# Patient Record
Sex: MALE | Race: OTHER | HISPANIC OR LATINO | URBAN - METROPOLITAN AREA
[De-identification: names, ages, dates, MRNs, and addresses within clinical notes are randomized per-mention and may not be internally consistent; named-entity substitution may affect disease eponyms.]

---

## 2017-11-22 ENCOUNTER — INPATIENT (INPATIENT)
Facility: HOSPITAL | Age: 52
LOS: 2 days | Discharge: HOME | DRG: 369 | End: 2017-11-25
Attending: INTERNAL MEDICINE | Admitting: INTERNAL MEDICINE
Payer: SELF-PAY

## 2017-11-22 VITALS
RESPIRATION RATE: 22 BRPM | DIASTOLIC BLOOD PRESSURE: 55 MMHG | SYSTOLIC BLOOD PRESSURE: 92 MMHG | HEIGHT: 69 IN | HEART RATE: 107 BPM | WEIGHT: 149.91 LBS

## 2017-11-22 DIAGNOSIS — K92.2 GASTROINTESTINAL HEMORRHAGE, UNSPECIFIED: ICD-10-CM

## 2017-11-22 LAB
ABO RH CONFIRMATION: SIGNIFICANT CHANGE UP
ALBUMIN SERPL ELPH-MCNC: 3 G/DL — LOW (ref 3.3–5.2)
ALP SERPL-CCNC: 32 U/L — LOW (ref 40–120)
ALT FLD-CCNC: 5 U/L — SIGNIFICANT CHANGE UP
ANION GAP SERPL CALC-SCNC: 12 MMOL/L — SIGNIFICANT CHANGE UP (ref 5–17)
ANION GAP SERPL CALC-SCNC: 9 MMOL/L — SIGNIFICANT CHANGE UP (ref 5–17)
APPEARANCE UR: CLEAR — SIGNIFICANT CHANGE UP
APTT BLD: 25.6 SEC — LOW (ref 27.5–37.4)
AST SERPL-CCNC: 12 U/L — SIGNIFICANT CHANGE UP
BASOPHILS # BLD AUTO: 0 K/UL — SIGNIFICANT CHANGE UP (ref 0–0.2)
BASOPHILS NFR BLD AUTO: 0.1 % — SIGNIFICANT CHANGE UP (ref 0–2)
BILIRUB SERPL-MCNC: 0.2 MG/DL — LOW (ref 0.4–2)
BILIRUB UR-MCNC: NEGATIVE — SIGNIFICANT CHANGE UP
BLD GP AB SCN SERPL QL: SIGNIFICANT CHANGE UP
BUN SERPL-MCNC: 31 MG/DL — HIGH (ref 8–20)
BUN SERPL-MCNC: 37 MG/DL — HIGH (ref 8–20)
CALCIUM SERPL-MCNC: 7.3 MG/DL — LOW (ref 8.6–10.2)
CALCIUM SERPL-MCNC: 7.6 MG/DL — LOW (ref 8.6–10.2)
CHLORIDE SERPL-SCNC: 103 MMOL/L — SIGNIFICANT CHANGE UP (ref 98–107)
CHLORIDE SERPL-SCNC: 108 MMOL/L — HIGH (ref 98–107)
CO2 SERPL-SCNC: 23 MMOL/L — SIGNIFICANT CHANGE UP (ref 22–29)
CO2 SERPL-SCNC: 24 MMOL/L — SIGNIFICANT CHANGE UP (ref 22–29)
COLOR SPEC: YELLOW — SIGNIFICANT CHANGE UP
CREAT SERPL-MCNC: 0.63 MG/DL — SIGNIFICANT CHANGE UP (ref 0.5–1.3)
CREAT SERPL-MCNC: 1.01 MG/DL — SIGNIFICANT CHANGE UP (ref 0.5–1.3)
DIFF PNL FLD: NEGATIVE — SIGNIFICANT CHANGE UP
EOSINOPHIL # BLD AUTO: 0 K/UL — SIGNIFICANT CHANGE UP (ref 0–0.5)
EOSINOPHIL NFR BLD AUTO: 0.2 % — SIGNIFICANT CHANGE UP (ref 0–5)
EPI CELLS # UR: SIGNIFICANT CHANGE UP
GLUCOSE BLDC GLUCOMTR-MCNC: 103 MG/DL — HIGH (ref 70–99)
GLUCOSE BLDC GLUCOMTR-MCNC: 106 MG/DL — HIGH (ref 70–99)
GLUCOSE SERPL-MCNC: 106 MG/DL — SIGNIFICANT CHANGE UP (ref 70–115)
GLUCOSE SERPL-MCNC: 209 MG/DL — HIGH (ref 70–115)
GLUCOSE UR QL: NEGATIVE MG/DL — SIGNIFICANT CHANGE UP
HCT VFR BLD CALC: 23.8 % — LOW (ref 42–52)
HCT VFR BLD CALC: 23.8 % — LOW (ref 42–52)
HCT VFR BLD CALC: 27 % — LOW (ref 42–52)
HGB BLD-MCNC: 8.2 G/DL — LOW (ref 14–18)
HGB BLD-MCNC: 8.3 G/DL — LOW (ref 14–18)
HGB BLD-MCNC: 9.5 G/DL — LOW (ref 14–18)
INR BLD: 1.19 RATIO — HIGH (ref 0.88–1.16)
KETONES UR-MCNC: NEGATIVE — SIGNIFICANT CHANGE UP
LACTATE BLDV-MCNC: 1.8 MMOL/L — SIGNIFICANT CHANGE UP (ref 0.5–2)
LACTATE BLDV-MCNC: 2.8 MMOL/L — HIGH (ref 0.5–2)
LEUKOCYTE ESTERASE UR-ACNC: NEGATIVE — SIGNIFICANT CHANGE UP
LYMPHOCYTES # BLD AUTO: 2 K/UL — SIGNIFICANT CHANGE UP (ref 1–4.8)
LYMPHOCYTES # BLD AUTO: 24 % — SIGNIFICANT CHANGE UP (ref 20–55)
MCHC RBC-ENTMCNC: 29.3 PG — SIGNIFICANT CHANGE UP (ref 27–31)
MCHC RBC-ENTMCNC: 29.5 PG — SIGNIFICANT CHANGE UP (ref 27–31)
MCHC RBC-ENTMCNC: 34.5 G/DL — SIGNIFICANT CHANGE UP (ref 32–36)
MCHC RBC-ENTMCNC: 35.2 G/DL — SIGNIFICANT CHANGE UP (ref 32–36)
MCV RBC AUTO: 83.3 FL — SIGNIFICANT CHANGE UP (ref 80–94)
MCV RBC AUTO: 85.6 FL — SIGNIFICANT CHANGE UP (ref 80–94)
MONOCYTES # BLD AUTO: 0.6 K/UL — SIGNIFICANT CHANGE UP (ref 0–0.8)
MONOCYTES NFR BLD AUTO: 6.7 % — SIGNIFICANT CHANGE UP (ref 3–10)
NEUTROPHILS # BLD AUTO: 5.7 K/UL — SIGNIFICANT CHANGE UP (ref 1.8–8)
NEUTROPHILS NFR BLD AUTO: 68.8 % — SIGNIFICANT CHANGE UP (ref 37–73)
NITRITE UR-MCNC: NEGATIVE — SIGNIFICANT CHANGE UP
PH UR: 7 — SIGNIFICANT CHANGE UP (ref 5–8)
PLATELET # BLD AUTO: 130 K/UL — LOW (ref 150–400)
PLATELET # BLD AUTO: 202 K/UL — SIGNIFICANT CHANGE UP (ref 150–400)
POTASSIUM SERPL-MCNC: 4.2 MMOL/L — SIGNIFICANT CHANGE UP (ref 3.5–5.3)
POTASSIUM SERPL-MCNC: 4.5 MMOL/L — SIGNIFICANT CHANGE UP (ref 3.5–5.3)
POTASSIUM SERPL-SCNC: 4.2 MMOL/L — SIGNIFICANT CHANGE UP (ref 3.5–5.3)
POTASSIUM SERPL-SCNC: 4.5 MMOL/L — SIGNIFICANT CHANGE UP (ref 3.5–5.3)
PROT SERPL-MCNC: 4.8 G/DL — LOW (ref 6.6–8.7)
PROT UR-MCNC: 15 MG/DL
PROTHROM AB SERPL-ACNC: 13.1 SEC — HIGH (ref 9.8–12.7)
RBC # BLD: 2.78 M/UL — LOW (ref 4.6–6.2)
RBC # BLD: 3.24 M/UL — LOW (ref 4.6–6.2)
RBC # FLD: 12.8 % — SIGNIFICANT CHANGE UP (ref 11–15.6)
RBC # FLD: 13.5 % — SIGNIFICANT CHANGE UP (ref 11–15.6)
SODIUM SERPL-SCNC: 139 MMOL/L — SIGNIFICANT CHANGE UP (ref 135–145)
SODIUM SERPL-SCNC: 140 MMOL/L — SIGNIFICANT CHANGE UP (ref 135–145)
SP GR SPEC: 1.01 — SIGNIFICANT CHANGE UP (ref 1.01–1.02)
TROPONIN T SERPL-MCNC: <0.01 NG/ML — SIGNIFICANT CHANGE UP (ref 0–0.06)
TYPE + AB SCN PNL BLD: SIGNIFICANT CHANGE UP
UROBILINOGEN FLD QL: NEGATIVE MG/DL — SIGNIFICANT CHANGE UP
WBC # BLD: 10.6 K/UL — SIGNIFICANT CHANGE UP (ref 4.8–10.8)
WBC # BLD: 8.4 K/UL — SIGNIFICANT CHANGE UP (ref 4.8–10.8)
WBC # FLD AUTO: 10.6 K/UL — SIGNIFICANT CHANGE UP (ref 4.8–10.8)
WBC # FLD AUTO: 8.4 K/UL — SIGNIFICANT CHANGE UP (ref 4.8–10.8)
WBC UR QL: SIGNIFICANT CHANGE UP

## 2017-11-22 RX ORDER — INFLUENZA VIRUS VACCINE 15; 15; 15; 15 UG/.5ML; UG/.5ML; UG/.5ML; UG/.5ML
0.5 SUSPENSION INTRAMUSCULAR ONCE
Qty: 0 | Refills: 0 | Status: DISCONTINUED | OUTPATIENT
Start: 2017-11-22 | End: 2017-11-25

## 2017-11-22 RX ORDER — SODIUM CHLORIDE 9 MG/ML
1000 INJECTION, SOLUTION INTRAVENOUS
Qty: 0 | Refills: 0 | Status: DISCONTINUED | OUTPATIENT
Start: 2017-11-22 | End: 2017-11-23

## 2017-11-22 RX ORDER — CEFTRIAXONE 500 MG/1
1 INJECTION, POWDER, FOR SOLUTION INTRAMUSCULAR; INTRAVENOUS ONCE
Qty: 0 | Refills: 0 | Status: COMPLETED | OUTPATIENT
Start: 2017-11-22 | End: 2017-11-22

## 2017-11-22 RX ORDER — PANTOPRAZOLE SODIUM 20 MG/1
40 TABLET, DELAYED RELEASE ORAL
Qty: 0 | Refills: 0 | Status: DISCONTINUED | OUTPATIENT
Start: 2017-11-22 | End: 2017-11-24

## 2017-11-22 RX ORDER — ONDANSETRON 8 MG/1
4 TABLET, FILM COATED ORAL EVERY 6 HOURS
Qty: 0 | Refills: 0 | Status: DISCONTINUED | OUTPATIENT
Start: 2017-11-22 | End: 2017-11-25

## 2017-11-22 RX ORDER — PANTOPRAZOLE SODIUM 20 MG/1
80 TABLET, DELAYED RELEASE ORAL ONCE
Qty: 0 | Refills: 0 | Status: COMPLETED | OUTPATIENT
Start: 2017-11-22 | End: 2017-11-22

## 2017-11-22 RX ORDER — PANTOPRAZOLE SODIUM 20 MG/1
8 TABLET, DELAYED RELEASE ORAL
Qty: 80 | Refills: 0 | Status: DISCONTINUED | OUTPATIENT
Start: 2017-11-22 | End: 2017-11-22

## 2017-11-22 RX ORDER — SODIUM CHLORIDE 9 MG/ML
500 INJECTION, SOLUTION INTRAVENOUS ONCE
Qty: 0 | Refills: 0 | Status: COMPLETED | OUTPATIENT
Start: 2017-11-22 | End: 2017-11-22

## 2017-11-22 RX ORDER — METOCLOPRAMIDE HCL 10 MG
10 TABLET ORAL EVERY 6 HOURS
Qty: 0 | Refills: 0 | Status: COMPLETED | OUTPATIENT
Start: 2017-11-22 | End: 2017-11-22

## 2017-11-22 RX ADMIN — SODIUM CHLORIDE 1000 MILLILITER(S): 9 INJECTION, SOLUTION INTRAVENOUS at 12:58

## 2017-11-22 RX ADMIN — CEFTRIAXONE 100 GRAM(S): 500 INJECTION, POWDER, FOR SOLUTION INTRAMUSCULAR; INTRAVENOUS at 06:13

## 2017-11-22 RX ADMIN — SODIUM CHLORIDE 125 MILLILITER(S): 9 INJECTION, SOLUTION INTRAVENOUS at 07:24

## 2017-11-22 RX ADMIN — Medication 10 MILLIGRAM(S): at 20:26

## 2017-11-22 RX ADMIN — PANTOPRAZOLE SODIUM 80 MILLIGRAM(S): 20 TABLET, DELAYED RELEASE ORAL at 04:43

## 2017-11-22 RX ADMIN — SODIUM CHLORIDE 125 MILLILITER(S): 9 INJECTION, SOLUTION INTRAVENOUS at 12:57

## 2017-11-22 RX ADMIN — PANTOPRAZOLE SODIUM 10 MG/HR: 20 TABLET, DELAYED RELEASE ORAL at 04:58

## 2017-11-22 RX ADMIN — SODIUM CHLORIDE 2000 MILLILITER(S): 9 INJECTION, SOLUTION INTRAVENOUS at 07:57

## 2017-11-22 RX ADMIN — PANTOPRAZOLE SODIUM 10 MG/HR: 20 TABLET, DELAYED RELEASE ORAL at 13:47

## 2017-11-22 RX ADMIN — Medication 10 MILLIGRAM(S): at 07:23

## 2017-11-22 RX ADMIN — PANTOPRAZOLE SODIUM 40 MILLIGRAM(S): 20 TABLET, DELAYED RELEASE ORAL at 20:27

## 2017-11-22 RX ADMIN — Medication 10 MILLIGRAM(S): at 13:48

## 2017-11-22 NOTE — BRIEF OPERATIVE NOTE - COMMENTS
PPI bid  Repeat EGD in 3 months to confirm ulcer healing  Treat for H. pylori if positive  Clear liquid diet

## 2017-11-22 NOTE — ED PROVIDER NOTE - OBJECTIVE STATEMENT
51 y/o with a PMHx of peptic ulcers BIBA to the ED c/o hematemesis, melanotic stool, dizziness, hypotension worsening over the last 6 days. Pt states that he has been vomiting for 6 hours. ROS is limited due to pt being in severe GI distress.

## 2017-11-22 NOTE — H&P ADULT - NSHPLABSRESULTS_GEN_ALL_CORE
139  |  103  |  37.0<H>  ----------------------------<  209<H>  4.2   |  24.0  |  1.01    Ca    7.6<L>      22 Nov 2017 05:02  TPro  4.8<L>  /  Alb  3.0<L>  /  TBili  0.2<L>  /  DBili  x   /  AST  12  /  ALT  5   /  AlkPhos  32<L>  11-22                       8.2    8.4   )-----------( 202      ( 22 Nov 2017 05:02 )             23.8   PT/INR - ( 22 Nov 2017 05:02 )   PT: 13.1 sec;   INR: 1.19 ratio    PTT - ( 22 Nov 2017 05:02 )  PTT:25.6 sec  LIVER FUNCTIONS - ( 22 Nov 2017 05:02 )  Alb: 3.0 g/dL / Pro: 4.8 g/dL / ALK PHOS: 32 U/L / ALT: 5 U/L / AST: 12 U/L / GGT: x

## 2017-11-22 NOTE — ED ADULT NURSE NOTE - OBJECTIVE STATEMENT
pt biba. pt came in diaphoretic, as per ems vomiting blood for the past 6 hours. pt has h/o gastric ulcers. MD gamez and code team 2 at bedside.

## 2017-11-22 NOTE — H&P ADULT - HISTORY OF PRESENT ILLNESS
52 year old male who's only PMHx is of peptic ulcer which was diagnosed 6 years ago presented to ER with a 6 hour history of 52 year old male who's only PMHx is of peptic ulcer which was diagnosed 6 years ago presented to ER with a 6 hour history of vomiting bright red blood.

## 2017-11-22 NOTE — ED PROVIDER NOTE - MEDICAL DECISION MAKING DETAILS
53 y/o with brisk upper GI bleed. Will start on protonic drip, transfused cross match blood due to hemo. Call GI to be evaluate in the ICU.

## 2017-11-22 NOTE — H&P ADULT - ATTENDING COMMENTS
I have seen and evaluated the patient and agree with the assessment and plan     GI bleed hx of peptic ulcer   EGD showing ulcer BID protonix, monitor overnight transfer in am if stabel  monitor h/h transfuse <7

## 2017-11-22 NOTE — ED ADULT TRIAGE NOTE - CHIEF COMPLAINT QUOTE
Pt with vomiting blood x's 6 hours, arrives pale, c/o abd pain 9/10, denies difficulty breathing, denies chest pain, hx of gastric ulcers, MD Moncada called to bedside

## 2017-11-22 NOTE — BRIEF OPERATIVE NOTE - POST-OP DX
Acute gastric ulcer with hemorrhage  11/22/2017    Active  Sanford White  Babita-Garrett tear  11/22/2017    Active  Sanford White

## 2017-11-22 NOTE — ED ADULT NURSE REASSESSMENT NOTE - NS ED NURSE REASSESS COMMENT FT1
Due to pt coughing, NG tube began to come out of pt mouth, as per MD Moncada, remove NG tube, pt feels relief, denies SOB, sat at 100% on room air, pt now showing NSR on monitor at 98 bpm, awaiting MICU bed to be clean, pt family @ bedside, will continue to monitor. Due to pt coughing, NG tube began to come out of pt mouth, as per MD Moncada, remove NG tube, pt feels relief, denies SOB, sat at 100% on room air, pt now showing NSR on monitor at 98 bpm, awaiting MICU bed to be clean, pt family @ bedside, 1:1 nursing care being provided, will continue to monitor.

## 2017-11-22 NOTE — H&P ADULT - NSHPPHYSICALEXAM_GEN_ALL_CORE
Physicial Exam:     Constitutional: NAD, well-groomed, well-developed  HEENT: PERRLA, EOMI, no drainage or redness  Neck: No bruits; no thyromegaly or nodules,  No JVD  Back: Normal spine flexure, No CVA tenderness, No deformity or limitation of movement  Respiratory: Breath Sounds equal & clear to percussion & auscultation, no accessory muscle use  Cardiovascular: Regular rate & rhythm, normal S1, S2; no murmurs, gallops or rubs; no S3, S4  Gastrointestinal: Soft, non-tender, non distended no hepatosplenomegaly, normal bowel sounds  Extremities: No peripheral edema, No cyanosis, clubbing   Vascular: Equal and normal pulses: 2+ peripheral pulses throughout  Neurological: GCS:    A&O x 3; no sensory, motor  deficits, normal reflexes  Psychiatric: Normal mood, normal affect  Musculoskeletal: No joint pain, swelling or deformity; no limitation of movement  Skin: No rashes

## 2017-11-22 NOTE — CONSULT NOTE ADULT - SUBJECTIVE AND OBJECTIVE BOX
HPI:  52 year old male who's only PMHx is of peptic ulcer which was diagnosed 6 years ago presented to ER with a 6 hour history of vomiting bright red blood. Patient was hemodynamically unstable and received 1 Litre of fluid bolus and also 2 units of PRBC transfusion. NG tube lavage was performed. During coughing episode, NG tube was noted to be displaced and then removed.       PAST MEDICAL & SURGICAL HISTORY:  Peptic ulcer  No significant past surgical history      ROS:  No Heartburn, regurgitation, dysphagia, odynophagia.  No dyspepsia  No abdominal pain.    No Nausea, vomiting.  No Bleeding.  + hematemesis.   No diarrhea.    No hematochezia  No weight loss, anorexia.  No edema.      MEDICATIONS  (STANDING):  multiple electrolytes Injection Type 1 1000 milliLiter(s) (125 mL/Hr) IV Continuous <Continuous>  pantoprazole Infusion 8 mG/Hr (10 mL/Hr) IV Continuous <Continuous>    MEDICATIONS  (PRN):  ondansetron Injectable 4 milliGRAM(s) IV Push every 6 hours PRN Nausea and/or Vomiting      Allergies    No Known Allergies    Intolerances        SOCIAL HISTORY:Denies x 3    ENDOSCOPIC/GI HISTORY: EGD 6 years ago    FAMILY HISTORY:  No pertinent family history in first degree relatives      Vital Signs Last 24 Hrs  T(C): 36.2 (22 Nov 2017 05:35), Max: 36.2 (22 Nov 2017 04:40)  T(F): 97.2 (22 Nov 2017 05:35), Max: 97.2 (22 Nov 2017 05:35)  HR: 98 (22 Nov 2017 06:03) (98 - 134)  BP: 98/63 (22 Nov 2017 06:03) (85/49 - 102/56)  BP(mean): --  RR: 18 (22 Nov 2017 06:03) (18 - 22)  SpO2: 100% (22 Nov 2017 06:03) (100% - 100%)    PHYSICAL EXAM:    GENERAL: NAD, well-groomed, well-developed  HEAD:  Atraumatic, Normocephalic  EYES: EOMI, PERRLA, conjunctiva and sclera clear  ENMT: No tonsillar erythema, exudates, or enlargement; Moist mucous membranes, Good dentition, No lesions  NECK: Supple, No JVD, Normal thyroid  CHEST/LUNG: Clear to percussion bilaterally; No rales, rhonchi, wheezing, or rubs  HEART: Regular rate and rhythm; No murmurs, rubs, or gallops  ABDOMEN: Soft, Nontender, Nondistended; Bowel sounds present  RECTAL:   EXTREMITIES:  2+ Peripheral Pulses, No clubbing, cyanosis, or edema  LYMPH: No lymphadenopathy noted  SKIN: No rashes or lesions      LABS:                        8.2    8.4   )-----------( 202      ( 22 Nov 2017 05:02 )             23.8     11-22    139  |  103  |  37.0<H>  ----------------------------<  209<H>  4.2   |  24.0  |  1.01    Ca    7.6<L>      22 Nov 2017 05:02    TPro  4.8<L>  /  Alb  3.0<L>  /  TBili  0.2<L>  /  DBili  x   /  AST  12  /  ALT  5   /  AlkPhos  32<L>  11-22    PT/INR - ( 22 Nov 2017 05:02 )   PT: 13.1 sec;   INR: 1.19 ratio         PTT - ( 22 Nov 2017 05:02 )  PTT:25.6 sec       LIVER FUNCTIONS - ( 22 Nov 2017 05:02 )  Alb: 3.0 g/dL / Pro: 4.8 g/dL / ALK PHOS: 32 U/L / ALT: 5 U/L / AST: 12 U/L / GGT: x           Troponin T, Serum: <0.01: Reference Interval for Troponin T  Less than 0.06 ng/mL - includes the 99th percentile of a healthy  population at a method C.V. of 10% or less.  NOTE: Troponin T is measured by the Roche CLIA method and these  results are not interchangable with Troponin I results since they are  different assays with different reference intervals. ng/mL (11.22.17 @ 05:02)    RADIOLOGY & ADDITIONAL STUDIES: HPI:  52 year old male who's only PMHx is of peptic ulcer which was diagnosed 6 years ago presented to ER with a 6 hour history of vomiting bright red blood. He also had epigastric pain. Patient was hemodynamically unstable and received 1 Litre of fluid bolus and also 2 units of PRBC transfusion. NG tube lavage was performed. During coughing episode, NG tube was noted to be displaced and then removed.       PAST MEDICAL & SURGICAL HISTORY:  Peptic ulcer  No significant past surgical history      ROS:  No Heartburn, regurgitation, dysphagia, odynophagia.  No dyspepsia  No abdominal pain.    No Nausea, vomiting.  No Bleeding.  + hematemesis.   No diarrhea.    No hematochezia  No weight loss, anorexia.  No edema.      MEDICATIONS  (STANDING):  multiple electrolytes Injection Type 1 1000 milliLiter(s) (125 mL/Hr) IV Continuous <Continuous>  pantoprazole Infusion 8 mG/Hr (10 mL/Hr) IV Continuous <Continuous>    MEDICATIONS  (PRN):  ondansetron Injectable 4 milliGRAM(s) IV Push every 6 hours PRN Nausea and/or Vomiting      Allergies    No Known Allergies    Intolerances        SOCIAL HISTORY:Denies x 3    ENDOSCOPIC/GI HISTORY: EGD 6 years ago    FAMILY HISTORY:  No pertinent family history in first degree relatives      Vital Signs Last 24 Hrs  T(C): 36.2 (22 Nov 2017 05:35), Max: 36.2 (22 Nov 2017 04:40)  T(F): 97.2 (22 Nov 2017 05:35), Max: 97.2 (22 Nov 2017 05:35)  HR: 98 (22 Nov 2017 06:03) (98 - 134)  BP: 98/63 (22 Nov 2017 06:03) (85/49 - 102/56)  BP(mean): --  RR: 18 (22 Nov 2017 06:03) (18 - 22)  SpO2: 100% (22 Nov 2017 06:03) (100% - 100%)    PHYSICAL EXAM:    GENERAL: NAD, well-groomed, well-developed  HEAD:  Atraumatic, Normocephalic  EYES: EOMI, PERRLA, conjunctiva and sclera clear  ENMT: No tonsillar erythema, exudates, or enlargement; Moist mucous membranes, Good dentition, No lesions  NECK: Supple, No JVD, Normal thyroid  CHEST/LUNG: Clear to percussion bilaterally; No rales, rhonchi, wheezing, or rubs  HEART: Regular rate and rhythm; No murmurs, rubs, or gallops  ABDOMEN: Soft, Nontender, Nondistended; Bowel sounds present  RECTAL: Maroon colored blood  EXTREMITIES:  2+ Peripheral Pulses, No clubbing, cyanosis, or edema  LYMPH: No lymphadenopathy noted  SKIN: No rashes or lesions      LABS:                        8.2    8.4   )-----------( 202      ( 22 Nov 2017 05:02 )             23.8     11-22    139  |  103  |  37.0<H>  ----------------------------<  209<H>  4.2   |  24.0  |  1.01    Ca    7.6<L>      22 Nov 2017 05:02    TPro  4.8<L>  /  Alb  3.0<L>  /  TBili  0.2<L>  /  DBili  x   /  AST  12  /  ALT  5   /  AlkPhos  32<L>  11-22    PT/INR - ( 22 Nov 2017 05:02 )   PT: 13.1 sec;   INR: 1.19 ratio         PTT - ( 22 Nov 2017 05:02 )  PTT:25.6 sec       LIVER FUNCTIONS - ( 22 Nov 2017 05:02 )  Alb: 3.0 g/dL / Pro: 4.8 g/dL / ALK PHOS: 32 U/L / ALT: 5 U/L / AST: 12 U/L / GGT: x           Troponin T, Serum: <0.01: Reference Interval for Troponin T  Less than 0.06 ng/mL - includes the 99th percentile of a healthy  population at a method C.V. of 10% or less.  NOTE: Troponin T is measured by the Roche CLIA method and these  results are not interchangable with Troponin I results since they are  different assays with different reference intervals. ng/mL (11.22.17 @ 05:02)    RADIOLOGY & ADDITIONAL STUDIES:

## 2017-11-22 NOTE — PATIENT PROFILE ADULT. - TEACHING/LEARNING LEARNING PREFERENCES
skill demonstration/group instruction/individual instruction/verbal instruction/video/written material/computer/internet/pictorial/audio

## 2017-11-22 NOTE — H&P ADULT - NSHPREVIEWOFSYSTEMS_GEN_ALL_CORE
REVIEW OF SYSTEMS:    CONSTITUTIONAL: pale vomiting blood x 6 hours  EYES: No eye pain, visual disturbances, or discharge  ENMT:  No difficulty hearing, tinnitus, vertigo; No sinus or throat pain  NECK: No pain or stiffness  BREASTS: No pain, masses, or nipple discharge  RESPIRATORY: No cough, wheezing, chills or hemoptysis; No shortness of breath  CARDIOVASCULAR: No chest pain, palpitations, dizziness, or leg swelling  GASTROINTESTINAL: No abdominal or epigastric pain. possitive nausea, vomiting, possitive hematemesis; No diarrhea or constipation. No melena or hematochezia.  GENITOURINARY: No dysuria, frequency, hematuria, or incontinence  NEUROLOGICAL: No headaches, memory loss, loss of strength, numbness, or tremors  SKIN: No itching, burning, rashes, or lesions   LYMPH NODES: No enlarged glands  ENDOCRINE: No heat or cold intolerance; No hair loss  MUSCULOSKELETAL: No joint pain or swelling; No muscle, back, or extremity pain  PSYCHIATRIC: No depression, anxiety, mood swings, or difficulty sleeping  HEME/LYMPH: No easy bruising, or bleeding gums  ALLERY AND IMMUNOLOGIC: No hives or eczema

## 2017-11-23 DIAGNOSIS — E87.6 HYPOKALEMIA: ICD-10-CM

## 2017-11-23 DIAGNOSIS — K27.9 PEPTIC ULCER, SITE UNSPECIFIED, UNSPECIFIED AS ACUTE OR CHRONIC, WITHOUT HEMORRHAGE OR PERFORATION: ICD-10-CM

## 2017-11-23 DIAGNOSIS — D62 ACUTE POSTHEMORRHAGIC ANEMIA: ICD-10-CM

## 2017-11-23 LAB
ANION GAP SERPL CALC-SCNC: 8 MMOL/L — SIGNIFICANT CHANGE UP (ref 5–17)
BUN SERPL-MCNC: 15 MG/DL — SIGNIFICANT CHANGE UP (ref 8–20)
CALCIUM SERPL-MCNC: 7.3 MG/DL — LOW (ref 8.6–10.2)
CHLORIDE SERPL-SCNC: 106 MMOL/L — SIGNIFICANT CHANGE UP (ref 98–107)
CO2 SERPL-SCNC: 26 MMOL/L — SIGNIFICANT CHANGE UP (ref 22–29)
CREAT SERPL-MCNC: 0.72 MG/DL — SIGNIFICANT CHANGE UP (ref 0.5–1.3)
GLUCOSE SERPL-MCNC: 101 MG/DL — SIGNIFICANT CHANGE UP (ref 70–115)
HCT VFR BLD CALC: 20.8 % — CRITICAL LOW (ref 42–52)
HGB BLD-MCNC: 7.4 G/DL — LOW (ref 14–18)
MAGNESIUM SERPL-MCNC: 2.1 MG/DL — SIGNIFICANT CHANGE UP (ref 1.6–2.6)
MCHC RBC-ENTMCNC: 29.8 PG — SIGNIFICANT CHANGE UP (ref 27–31)
MCHC RBC-ENTMCNC: 35.6 G/DL — SIGNIFICANT CHANGE UP (ref 32–36)
MCV RBC AUTO: 83.9 FL — SIGNIFICANT CHANGE UP (ref 80–94)
PHOSPHATE SERPL-MCNC: 2.9 MG/DL — SIGNIFICANT CHANGE UP (ref 2.4–4.7)
PLATELET # BLD AUTO: 114 K/UL — LOW (ref 150–400)
POTASSIUM SERPL-MCNC: 3.6 MMOL/L — SIGNIFICANT CHANGE UP (ref 3.5–5.3)
POTASSIUM SERPL-SCNC: 3.6 MMOL/L — SIGNIFICANT CHANGE UP (ref 3.5–5.3)
RBC # BLD: 2.48 M/UL — LOW (ref 4.6–6.2)
RBC # FLD: 14.1 % — SIGNIFICANT CHANGE UP (ref 11–15.6)
SODIUM SERPL-SCNC: 140 MMOL/L — SIGNIFICANT CHANGE UP (ref 135–145)
WBC # BLD: 4.6 K/UL — LOW (ref 4.8–10.8)
WBC # FLD AUTO: 4.6 K/UL — LOW (ref 4.8–10.8)

## 2017-11-23 RX ORDER — POTASSIUM CHLORIDE 20 MEQ
10 PACKET (EA) ORAL
Qty: 0 | Refills: 0 | Status: DISCONTINUED | OUTPATIENT
Start: 2017-11-23 | End: 2017-11-23

## 2017-11-23 RX ORDER — POTASSIUM CHLORIDE 20 MEQ
40 PACKET (EA) ORAL EVERY 4 HOURS
Qty: 0 | Refills: 0 | Status: COMPLETED | OUTPATIENT
Start: 2017-11-23 | End: 2017-11-23

## 2017-11-23 RX ADMIN — PANTOPRAZOLE SODIUM 40 MILLIGRAM(S): 20 TABLET, DELAYED RELEASE ORAL at 06:42

## 2017-11-23 RX ADMIN — PANTOPRAZOLE SODIUM 40 MILLIGRAM(S): 20 TABLET, DELAYED RELEASE ORAL at 16:55

## 2017-11-23 RX ADMIN — Medication 40 MILLIEQUIVALENT(S): at 10:32

## 2017-11-23 RX ADMIN — Medication 100 MILLIEQUIVALENT(S): at 09:23

## 2017-11-23 RX ADMIN — SODIUM CHLORIDE 125 MILLILITER(S): 9 INJECTION, SOLUTION INTRAVENOUS at 01:11

## 2017-11-23 RX ADMIN — Medication 40 MILLIEQUIVALENT(S): at 12:45

## 2017-11-23 NOTE — PROGRESS NOTE ADULT - ATTENDING COMMENTS
Pt no longer requires ICU LOC at this time, transfer to monitored bed. Care endorsed to Dr. Hartmann

## 2017-11-23 NOTE — PROGRESS NOTE ADULT - PROBLEM SELECTOR PLAN 2
H/H dropped but likely dilutional as all cell lines are down. Will transfuse 1unit PRBC today and monitor for any s/s of rebleed.

## 2017-11-24 DIAGNOSIS — K22.6 GASTRO-ESOPHAGEAL LACERATION-HEMORRHAGE SYNDROME: ICD-10-CM

## 2017-11-24 LAB
HCT VFR BLD CALC: 24.9 % — LOW (ref 42–52)
HGB BLD-MCNC: 8.6 G/DL — LOW (ref 14–18)
MCHC RBC-ENTMCNC: 29.1 PG — SIGNIFICANT CHANGE UP (ref 27–31)
MCHC RBC-ENTMCNC: 34.5 G/DL — SIGNIFICANT CHANGE UP (ref 32–36)
MCV RBC AUTO: 84.1 FL — SIGNIFICANT CHANGE UP (ref 80–94)
PLATELET # BLD AUTO: 150 K/UL — SIGNIFICANT CHANGE UP (ref 150–400)
RBC # BLD: 2.96 M/UL — LOW (ref 4.6–6.2)
RBC # FLD: 14.4 % — SIGNIFICANT CHANGE UP (ref 11–15.6)
WBC # BLD: 3.5 K/UL — LOW (ref 4.8–10.8)
WBC # FLD AUTO: 3.5 K/UL — LOW (ref 4.8–10.8)

## 2017-11-24 RX ORDER — PANTOPRAZOLE SODIUM 20 MG/1
1 TABLET, DELAYED RELEASE ORAL
Qty: 4 | Refills: 0 | OUTPATIENT
Start: 2017-11-24 | End: 2017-11-26

## 2017-11-24 RX ORDER — FERROUS SULFATE 325(65) MG
325 TABLET ORAL
Qty: 0 | Refills: 0 | Status: DISCONTINUED | OUTPATIENT
Start: 2017-11-24 | End: 2017-11-25

## 2017-11-24 RX ORDER — PANTOPRAZOLE SODIUM 20 MG/1
40 TABLET, DELAYED RELEASE ORAL
Qty: 0 | Refills: 0 | Status: DISCONTINUED | OUTPATIENT
Start: 2017-11-24 | End: 2017-11-25

## 2017-11-24 RX ORDER — PANTOPRAZOLE SODIUM 20 MG/1
1 TABLET, DELAYED RELEASE ORAL
Qty: 120 | Refills: 0 | OUTPATIENT
Start: 2017-11-24 | End: 2018-01-23

## 2017-11-24 RX ADMIN — PANTOPRAZOLE SODIUM 40 MILLIGRAM(S): 20 TABLET, DELAYED RELEASE ORAL at 06:06

## 2017-11-24 RX ADMIN — PANTOPRAZOLE SODIUM 40 MILLIGRAM(S): 20 TABLET, DELAYED RELEASE ORAL at 17:01

## 2017-11-24 RX ADMIN — Medication 325 MILLIGRAM(S): at 17:01

## 2017-11-24 NOTE — PROGRESS NOTE ADULT - ASSESSMENT
52 year old male with Upper GI bleeding
52 year old male who's only PMH x is of peptic ulcer which was diagnosed 6 years ago presented to ER with a 6 hour history of vomiting bright red blood. Admitted to ICU for severe GI Bleed on PPI Drip. GI consulted, had EGD which showed showed gastric ulcer. Transferred to Hospitalist service 11/23    Upper GI Bleed:  from Gastric Ulcer   GI consulted, S/P EGD showed larger gastric Ulcer with hemorrhage and Babita Garrett tear  Monitor Hb very closely  Continue PPI Q 12  advance diet today  Needs follow up in EGD in 6 weeks to make sure his Gastric ulcer has healed completely. follow up with Dr White in 6 weeks    Acute Blood Loss related anemia : from Upper GI Bleed  S/P 1 U PRBC 1/23  repeat Hb in am    DVT Prophylaxis :  continue SCD BOOTS  chemical Prophylaxis contraindicated with active GI Bleed    Anticipate Home in am, if he tolerates advanced diet well and no more bleeding with stable Hb
52 year old male who's only PMH x is of peptic ulcer which was diagnosed 6 years ago presented to ER with a 6 hour history of vomiting bright red blood. Admitted to ICU for severe GI Bleed on PPI Drip. GI consulted, had EGD which showed showed gastric ulcer. Transferred to Hospitalist service 11/23    Upper GI Bleed:  from Gastric Ulcer  GI consulted, S/P EGD showed larger gastric Ulcer  Monitor Hb very closely  Continue PPI Q 12  CLD only  advance tomorrow, if hb stable    Acute Blood Loss related anemia : from Upper GI Bleed  Transfusing 1 U PRBC today  repeat Hb in am    DVT Prophylaxis :  continue SCD BOOTS  chemical Prophylaxis contraindicated with active GI Bleed

## 2017-11-24 NOTE — PROGRESS NOTE ADULT - SUBJECTIVE AND OBJECTIVE BOX
Patient is a 52y old  Male who presents with a chief complaint of upper GI bleeding / hypotension (2017 06:24)      BRIEF HOSPITAL COURSE: 52 year old male who's only PMHx is of peptic ulcer which was diagnosed 6 years ago presented to ER with a 6 hour history of vomiting bright red blood.     Events last 24 hours: EGD performed yesterday showing Babita Garrett tear, Large gastric ulcer- non bleeding       PAST MEDICAL & SURGICAL HISTORY:  Peptic ulcer  No significant past surgical history      Review of Systems:  CONSTITUTIONAL: No fever, chills, or fatigue  EYES: No eye pain, visual disturbances, or discharge  ENMT:  No difficulty hearing, tinnitus, vertigo; No sinus or throat pain  NECK: No pain or stiffness  RESPIRATORY: No cough, wheezing, chills or hemoptysis; No shortness of breath  CARDIOVASCULAR: No chest pain, palpitations, dizziness, or leg swelling  GASTROINTESTINAL: No abdominal or epigastric pain. No nausea, vomiting, or hematemesis; No diarrhea or constipation. No melena or hematochezia.  GENITOURINARY: No dysuria, frequency, hematuria, or incontinence  NEUROLOGICAL: No headaches, memory loss, loss of strength, numbness, or tremors  SKIN: No itching, burning, rashes, or lesions   MUSCULOSKELETAL: No joint pain or swelling; No muscle, back, or extremity pain  PSYCHIATRIC: No depression, anxiety, mood swings, or difficulty sleeping      Medications:        ondansetron Injectable 4 milliGRAM(s) IV Push every 6 hours PRN        pantoprazole  Injectable 40 milliGRAM(s) IV Push two times a day        potassium chloride    Tablet ER 40 milliEquivalent(s) Oral every 4 hours    influenza   Vaccine 0.5 milliLiter(s) IntraMuscular once              ICU Vital Signs Last 24 Hrs  T(C): 36.9 (2017 08:30), Max: 37.3 (2017 04:00)  T(F): 98.4 (2017 08:30), Max: 99.2 (2017 04:00)  HR: 88 (2017 08:30) (70 - 104)  BP: 102/61 (2017 08:30) (79/52 - 112/59)  BP(mean): 72 (2017 08:30) (61 - 78)  ABP: --  ABP(mean): --  RR: 19 (2017 08:30) (6 - 23)  SpO2: 100% (2017 08:30) (93% - 100%)          I&O's Detail    2017 07:01  -  2017 07:00  --------------------------------------------------------  IN:    0.9% NaCl: 1000 mL    multiple electrolytes Injection Type 1multiple electrolytes Injection Type 1: 2875 mL    pantoprazole Infusion: 100 mL  Total IN: 3975 mL    OUT:    Voided: 1250 mL  Total OUT: 1250 mL    Total NET: 2725 mL            LABS:                        7.4    4.6   )-----------( 114      ( 2017 05:04 )             20.8         140  |  106  |  15.0  ----------------------------<  101  3.6   |  26.0  |  0.72    Ca    7.3<L>      2017 05:04  Phos  2.9       Mg     2.1         TPro  4.8<L>  /  Alb  3.0<L>  /  TBili  0.2<L>  /  DBili  x   /  AST  12  /  ALT  5   /  AlkPhos  32<L>  22      CARDIAC MARKERS ( 2017 05:02 )  x     / <0.01 ng/mL / x     / x     / x          CAPILLARY BLOOD GLUCOSE      POCT Blood Glucose.: 106 mg/dL (2017 17:12)    PT/INR - ( 2017 05:02 )   PT: 13.1 sec;   INR: 1.19 ratio         PTT - ( 2017 05:02 )  PTT:25.6 sec  Urinalysis Basic - ( 2017 11:52 )    Color: Yellow / Appearance: Clear / S.010 / pH: x  Gluc: x / Ketone: Negative  / Bili: Negative / Urobili: Negative mg/dL   Blood: x / Protein: 15 mg/dL / Nitrite: Negative   Leuk Esterase: Negative / RBC: x / WBC 3-5   Sq Epi: x / Non Sq Epi: Occasional / Bacteria: x      CULTURES:      Physical Examination:    General: No acute distress.      HEENT: Pupils equal, reactive to light.  Symmetric.    PULM: Clear to auscultation bilaterally, no significant sputum production    CVS: Regular rate and rhythm, no murmurs, rubs, or gallops    ABD: Soft, nondistended, nontender, normoactive bowel sounds, no masses    EXT: No edema, nontender    SKIN: Warm and well perfused, no rashes noted.    NEURO: Alert, oriented, interactive, nonfocal    RADIOLOGY:   < from: Xray Chest 1 View AP/PA. (17 @ 05:04) >  Findings:  A nasogastric tube is noted with the tip in the region of the gastric   fundus. The lungs are clear. The heart and mediastinum are unremarkable.   No evidence of pneumothorax or pleural effusion..    Impression:  No evidence of acute cardiopulmonary disease. NG tube noted with the tip   in the stomach..      < end of copied text >    CRITICAL CARE TIME SPENT: ***
Patient is a 52y old  Male who presents with a chief complaint of upper GI bleeding / hypotension (22 Nov 2017 06:24)      HPI: seen in ICU  52 year old male who's only PMHx is of peptic ulcer which was diagnosed 6 years ago presented to ER with a 6 hour history of vomiting bright red blood. . Pt Has had no further bleeding. EGd showed gastric ulcer. No acitve bleeding. Pt has no had BM since admission. [place] some cramping yesterday      REVIEW OF SYSTEMS:  Constitutional: No fever, weight loss or fatigue  ENMT:  No difficulty hearing, tinnitus, vertigo; No sinus or throat pain  Respiratory: No cough, wheezing, chills or hemoptysis  Cardiovascular: No chest pain, palpitations, dizziness or leg swelling  Gastrointestinal: No abdominal or epigastric pain. No nausea, vomiting or hematemesis; No diarrhea or constipation. No melena or hematochezia.  Skin: No itching, burning, rashes or lesions   Musculoskeletal: No joint pain or swelling; No muscle, back or extremity pain    PAST MEDICAL & SURGICAL HISTORY:  Peptic ulcer  No significant past surgical history      FAMILY HISTORY:  No pertinent family history in first degree relatives      SOCIAL HISTORY:  Smoking Status: [ ] Current, [ ] Former, [ ] Never  Pack Years:  [  ] EtOH-no  [  ] IVDA-no    MEDICATIONS:  MEDICATIONS  (STANDING):  influenza   Vaccine 0.5 milliLiter(s) IntraMuscular once  pantoprazole  Injectable 40 milliGRAM(s) IV Push two times a day  potassium chloride    Tablet ER 40 milliEquivalent(s) Oral every 4 hours    MEDICATIONS  (PRN):  ondansetron Injectable 4 milliGRAM(s) IV Push every 6 hours PRN Nausea and/or Vomiting      Allergies    No Known Allergies    Intolerances        Vital Signs Last 24 Hrs  T(C): 36.9 (23 Nov 2017 08:30), Max: 37.3 (23 Nov 2017 04:00)  T(F): 98.4 (23 Nov 2017 08:30), Max: 99.2 (23 Nov 2017 04:00)  HR: 84 (23 Nov 2017 09:00) (70 - 104)  BP: 103/56 (23 Nov 2017 09:00) (79/52 - 112/59)  BP(mean): 73 (23 Nov 2017 09:00) (61 - 78)  RR: 19 (23 Nov 2017 09:00) (6 - 23)  SpO2: 100% (23 Nov 2017 09:00) (93% - 100%)    11-22 @ 07:01  -  11-23 @ 07:00  --------------------------------------------------------  IN: 3975 mL / OUT: 1250 mL / NET: 2725 mL          PHYSICAL EXAM:    General: Well developed; well nourished; in no acute distress  HEENT: MMM, conjunctiva and sclera clear  H- RR  L- CTA  Gastrointestinal: Soft, non-tender non-distended; Normal bowel sounds; No rebound or guarding  Extremities: Normal range of motion, No clubbing, cyanosis or edema  Neurological: Alert and oriented x3  Skin: Warm and dry. No obvious rash      LABS:                        7.4    4.6   )-----------( 114      ( 23 Nov 2017 05:04 )             20.8     23 Nov 2017 05:04    140    |  106    |  15.0   ----------------------------<  101    3.6     |  26.0   |  0.72     Ca    7.3        23 Nov 2017 05:04  Phos  2.9       23 Nov 2017 05:04  Mg     2.1       23 Nov 2017 05:04    TPro  4.8    /  Alb  3.0    /  TBili  0.2    /  DBili  x      /  AST  12     /  ALT  5      /  AlkPhos  32     / Amylase x      /Lipase x      22 Nov 2017 05:02              RADIOLOGY & ADDITIONAL STUDIES:
Patient is a 52y old  Male who presents with a chief complaint of upper GI bleeding / hypotension (22 Nov 2017 06:24)      HPI:  52 year old male who's only PMHx is of peptic ulcer which was diagnosed 6 years ago presented to ER with a 6 hour history of vomiting bright red blood. (22 Nov 2017 06:24). Pt noted to have both chetan wiess teat and large . No active bleeding and pt did need intervention. No BM since admission. NO abdominal pain      REVIEW OF SYSTEMS:  Constitutional: No fever, weight loss or fatigue  ENMT:  No difficulty hearing, tinnitus, vertigo; No sinus or throat pain  Respiratory: No cough, wheezing, chills or hemoptysis  Cardiovascular: No chest pain, palpitations, dizziness or leg swelling  Gastrointestinal: No abdominal or epigastric pain. No nausea, vomiting or hematemesis; No diarrhea or constipation. No melena or hematochezia.  Skin: No itching, burning, rashes or lesions   Musculoskeletal: No joint pain or swelling; No muscle, back or extremity pain    PAST MEDICAL & SURGICAL HISTORY:  Peptic ulcer  No significant past surgical history      FAMILY HISTORY:  No pertinent family history in first degree relatives      SOCIAL HISTORY:  Smoking Status: [ ] Current, [ ] Former, [ ] Never  Pack Years:  [  ] EtOH-no  [  ] IVDA-no    MEDICATIONS:  MEDICATIONS  (STANDING):  influenza   Vaccine 0.5 milliLiter(s) IntraMuscular once  pantoprazole  Injectable 40 milliGRAM(s) IV Push two times a day    MEDICATIONS  (PRN):  ondansetron Injectable 4 milliGRAM(s) IV Push every 6 hours PRN Nausea and/or Vomiting      Allergies    No Known Allergies    Intolerances        Vital Signs Last 24 Hrs  T(C): 36.9 (23 Nov 2017 21:41), Max: 37.1 (23 Nov 2017 15:51)  T(F): 98.5 (23 Nov 2017 21:41), Max: 98.8 (23 Nov 2017 15:51)  HR: 80 (23 Nov 2017 21:41) (80 - 90)  BP: 96/53 (23 Nov 2017 21:41) (96/50 - 104/61)  BP(mean): 78 (23 Nov 2017 10:00) (73 - 78)  RR: 18 (23 Nov 2017 21:41) (18 - 25)  SpO2: 98% (23 Nov 2017 21:41) (98% - 100%)        PHYSICAL EXAM:    General: Well developed; well nourished; in no acute distress  HEENT: MMM, conjunctiva and sclera clear  H- RRR  L- CTA  Gastrointestinal: Soft, non-tender non-distended; Normal bowel sounds; No rebound or guarding  Extremities: Normal range of motion, No clubbing, cyanosis or edema  Neurological: Alert and oriented x3  Skin: Warm and dry. No obvious rash      LABS:                        7.4    4.6   )-----------( 114      ( 23 Nov 2017 05:04 )             20.8     23 Nov 2017 05:04    140    |  106    |  15.0   ----------------------------<  101    3.6     |  26.0   |  0.72     Ca    7.3        23 Nov 2017 05:04  Phos  2.9       23 Nov 2017 05:04  Mg     2.1       23 Nov 2017 05:04                RADIOLOGY & ADDITIONAL STUDIES:     < from: Xray Chest 1 View AP/PA. (11.22.17 @ 05:04) >  mpression:  No evidence of acute cardiopulmonary disease. NG tube noted with the tip   in the stomach..      < end of copied text >
TANNER FOY  ----------------------------------------    CC: FOLLOW UP VISIT FOR GI BLEED    Hb improved to 8.6 after 1 U PRBC transfusion yesterday  transitioned to PO PPI Q 12    Denied any chest pain, palpitations, dyspnea, or abdominal pain.    Vital Signs Last 24 Hrs  T(C): 37.1 (2017 08:46), Max: 37.1 (2017 15:51)  T(F): 98.7 (2017 08:46), Max: 98.8 (2017 15:51)  HR: 84 (2017 08:46) (80 - 90)  BP: 104/53 (2017 08:46) (96/50 - 104/53)  BP(mean): --  RR: 18 (2017 08:46) (18 - 22)  SpO2: 98% (2017 21:41) (98% - 100%)    CAPILLARY BLOOD GLUCOSE        PHYSICAL EXAMINATION:  ----------------------------------------    General appearance: NAD, Awake, Alert  HEENT: NCAT, Conjunctiva clear, EOMI  Neck: Supple, No JVD  Lungs: Clear to auscultation, Breath sound equal bilaterally  Cardiovascular: S1S2, Regular rhythm  Abdomen: Soft, Nontender, Positive bowel sounds  Extremities: No clubbing, No cyanosis, No edema  CNS: alert and oriented times 3      LABORATORY STUDIES:  ----------------------------------------                        8.6    3.5   )-----------( 150      ( 2017 08:28 )             24.9         140  |  106  |  15.0  ----------------------------<  101  3.6   |  26.0  |  0.72    Ca    7.3<L>      2017 05:04  Phos  2.9       Mg     2.1           Urinalysis Basic - ( 2017 11:52 )    Color: Yellow / Appearance: Clear / S.010 / pH: x  Gluc: x / Ketone: Negative  / Bili: Negative / Urobili: Negative mg/dL   Blood: x / Protein: 15 mg/dL / Nitrite: Negative   Leuk Esterase: Negative / RBC: x / WBC 3-5   Sq Epi: x / Non Sq Epi: Occasional / Bacteria: x        MEDICATIONS  (STANDING):  influenza   Vaccine 0.5 milliLiter(s) IntraMuscular once  pantoprazole    Tablet 40 milliGRAM(s) Oral two times a day before meals    MEDICATIONS  (PRN):  ondansetron Injectable 4 milliGRAM(s) IV Push every 6 hours PRN Nausea and/or Vomiting      ASSESSMENT / PLAN:  ----------------------------------------
TANNER FOY  ----------------------------------------    cc:  ICU transfer : admitted for GI Bleed    Asked for ICU transfer Out  Hb dropped, for which Pt is getting 1 more Unit of PRBC this am  Fiance at bed side, answered all questions    The patient is in no acute distress.  Denied any chest pain, palpitations, dyspnea, or abdominal pain.    Vital Signs Last 24 Hrs  T(C): 36.7 (2017 12:09), Max: 37.3 (2017 04:00)  T(F): 98.1 (2017 12:09), Max: 99.2 (2017 04:00)  HR: 84 (2017 12:09) (70 - 104)  BP: 101/60 (2017 12:09) (79/52 - 112/59)  BP(mean): 78 (2017 10:00) (62 - 78)  RR: 22 (2017 12:09) (6 - 25)  SpO2: 100% (2017 12:09) (93% - 100%)    CAPILLARY BLOOD GLUCOSE      POCT Blood Glucose.: 106 mg/dL (2017 17:12)    PHYSICAL EXAMINATION:  ----------------------------------------    General appearance: NAD, Awake, Alert  HEENT: NCAT, Conjunctiva clear, EOMI  + pallor; No Icterus   Neck: Supple, No JVD  Lungs: Clear to auscultation, Breath sound equal bilaterally  Cardiovascular: S1S2, Regular rhythm  Abdomen: Soft, Nontender, Positive bowel sounds  Extremities: No clubbing, No cyanosis, No edema  CNS: alert and Oriented times 3    LABORATORY STUDIES:  ----------------------------------------                        7.4    4.6   )-----------( 114      ( 2017 05:04 )             20.8     11-23    140  |  106  |  15.0  ----------------------------<  101  3.6   |  26.0  |  0.72    Ca    7.3<L>      2017 05:04  Phos  2.9       Mg     2.1         TPro  4.8<L>  /  Alb  3.0<L>  /  TBili  0.2<L>  /  DBili  x   /  AST  12  /  ALT  5   /  AlkPhos  32<L>      LIVER FUNCTIONS - ( 2017 05:02 )  Alb: 3.0 g/dL / Pro: 4.8 g/dL / ALK PHOS: 32 U/L / ALT: 5 U/L / AST: 12 U/L / GGT: x           PT/INR - ( 2017 05:02 )   PT: 13.1 sec;   INR: 1.19 ratio         PTT - ( 2017 05:02 )  PTT:25.6 sec    CARDIAC MARKERS ( 2017 05:02 )  x     / <0.01 ng/mL / x     / x     / x            Urinalysis Basic - ( 2017 11:52 )    Color: Yellow / Appearance: Clear / S.010 / pH: x  Gluc: x / Ketone: Negative  / Bili: Negative / Urobili: Negative mg/dL   Blood: x / Protein: 15 mg/dL / Nitrite: Negative   Leuk Esterase: Negative / RBC: x / WBC 3-5   Sq Epi: x / Non Sq Epi: Occasional / Bacteria: x        MEDICATIONS  (STANDING):  influenza   Vaccine 0.5 milliLiter(s) IntraMuscular once  pantoprazole  Injectable 40 milliGRAM(s) IV Push two times a day    MEDICATIONS  (PRN):  ondansetron Injectable 4 milliGRAM(s) IV Push every 6 hours PRN Nausea and/or Vomiting      ASSESSMENT / PLAN:  ----------------------------------------

## 2017-11-24 NOTE — PROGRESS NOTE ADULT - PROBLEM SELECTOR PLAN 1
Patient with gastric ulcer. No active bleeding. No intervention needed   Hemoglobin 7.3. Got one unit of blood this am. Awaiting repeat H+H   Continue protonix bid  continue clear liquids . If H+H stable in am, then will advance diet.   Will need EGD in 6 weeks as outpatient.   May transfer to medical floor
EGD yesterday- large ulcer nonbleeding  protonix BID
Gastric ulcer   H+H was 7.3 yesterday and he received PRBC  Will advance diet today.  Change to protonix po  Check H+H today. If stable, may D/C home this afternoon  Please call Dr Peterson if any questions  F/U with Dr White in 4 weeks. Pt needs repeat EGD in 6 weeks as outpatient to evaluate for  healing

## 2017-11-25 VITALS
RESPIRATION RATE: 18 BRPM | TEMPERATURE: 98 F | DIASTOLIC BLOOD PRESSURE: 76 MMHG | HEART RATE: 71 BPM | SYSTOLIC BLOOD PRESSURE: 125 MMHG

## 2017-11-25 LAB
HCT VFR BLD CALC: 25.1 % — LOW (ref 42–52)
HGB BLD-MCNC: 8.6 G/DL — LOW (ref 14–18)
MCHC RBC-ENTMCNC: 29.1 PG — SIGNIFICANT CHANGE UP (ref 27–31)
MCHC RBC-ENTMCNC: 34.3 G/DL — SIGNIFICANT CHANGE UP (ref 32–36)
MCV RBC AUTO: 84.8 FL — SIGNIFICANT CHANGE UP (ref 80–94)
PLATELET # BLD AUTO: 186 K/UL — SIGNIFICANT CHANGE UP (ref 150–400)
RBC # BLD: 2.96 M/UL — LOW (ref 4.6–6.2)
RBC # FLD: 14.1 % — SIGNIFICANT CHANGE UP (ref 11–15.6)
WBC # BLD: 3 K/UL — LOW (ref 4.8–10.8)
WBC # FLD AUTO: 3 K/UL — LOW (ref 4.8–10.8)

## 2017-11-25 RX ORDER — PANTOPRAZOLE SODIUM 20 MG/1
1 TABLET, DELAYED RELEASE ORAL
Qty: 0 | Refills: 0 | COMMUNITY
Start: 2017-11-25

## 2017-11-25 RX ADMIN — Medication 325 MILLIGRAM(S): at 08:37

## 2017-11-25 RX ADMIN — PANTOPRAZOLE SODIUM 40 MILLIGRAM(S): 20 TABLET, DELAYED RELEASE ORAL at 05:59

## 2017-11-25 NOTE — DISCHARGE NOTE ADULT - PATIENT PORTAL LINK FT
“You can access the FollowHealth Patient Portal, offered by [facility], by registering with the following website: [email or web address]”

## 2017-11-25 NOTE — DISCHARGE NOTE ADULT - MEDICATION SUMMARY - MEDICATIONS TO TAKE
I will START or STAY ON the medications listed below when I get home from the hospital:    pantoprazole 40 mg oral delayed release tablet  -- 1 tab(s) by mouth 2 times a day (before meals)  -- Indication: For Gastric ulcer

## 2017-11-25 NOTE — DISCHARGE NOTE ADULT - PROVIDER TOKENS
TOKEN:'[id number]',FREE:[LAST:[PCP],PHONE:[(   )    -],FAX:[(   )    -],ADDRESS:[Follow up in 2 weeks with CBC]]

## 2017-11-25 NOTE — DISCHARGE NOTE ADULT - ADDITIONAL INSTRUCTIONS
please follow up with Gastroenterologist Dr White in 6 weeks and have follow up EGD to make sure Gastric Ulcer has healed completely. Please take Medications as adviced.   Please follow up with your family Doctor in 2 weeks. Check CBC to make sure Hemoglobin is stable

## 2017-11-25 NOTE — DISCHARGE NOTE ADULT - CARE PLAN
Principal Discharge DX:	UGI bleed  Goal:	No more bleeding and hemoglobin stable  Instructions for follow-up, activity and diet:	please follow up with Gastroenterologist Dr White in 6 weeks  Secondary Diagnosis:	Acute blood loss anemia  Secondary Diagnosis:	Acute gastric ulcer with hemorrhage  Instructions for follow-up, activity and diet:	Follow up EGD in 6 weeks with GI to make sure Ulcer has healed completely  Secondary Diagnosis:	Babita-Garrett tear

## 2017-11-25 NOTE — DISCHARGE NOTE ADULT - PLAN OF CARE
No more bleeding and hemoglobin stable please follow up with Gastroenterologist Dr White in 6 weeks Follow up EGD in 6 weeks with GI to make sure Ulcer has healed completely

## 2020-05-22 ENCOUNTER — EMERGENCY (EMERGENCY)
Facility: HOSPITAL | Age: 55
LOS: 1 days | Discharge: HOME | End: 2020-05-22
Attending: EMERGENCY MEDICINE
Payer: SELF-PAY

## 2020-05-22 VITALS
HEART RATE: 69 BPM | DIASTOLIC BLOOD PRESSURE: 77 MMHG | OXYGEN SATURATION: 98 % | TEMPERATURE: 98 F | RESPIRATION RATE: 18 BRPM | SYSTOLIC BLOOD PRESSURE: 117 MMHG

## 2020-05-22 VITALS
TEMPERATURE: 99 F | HEIGHT: 63.78 IN | HEART RATE: 81 BPM | DIASTOLIC BLOOD PRESSURE: 81 MMHG | SYSTOLIC BLOOD PRESSURE: 118 MMHG | WEIGHT: 164.91 LBS | OXYGEN SATURATION: 96 % | RESPIRATION RATE: 18 BRPM

## 2020-05-22 PROBLEM — K27.9 PEPTIC ULCER, SITE UNSPECIFIED, UNSPECIFIED AS ACUTE OR CHRONIC, WITHOUT HEMORRHAGE OR PERFORATION: Chronic | Status: ACTIVE | Noted: 2017-11-22

## 2020-05-22 LAB
ALBUMIN SERPL ELPH-MCNC: 4.4 G/DL — SIGNIFICANT CHANGE UP (ref 3.3–5.2)
ALP SERPL-CCNC: 78 U/L — SIGNIFICANT CHANGE UP (ref 40–120)
ALT FLD-CCNC: 10 U/L — SIGNIFICANT CHANGE UP
ANION GAP SERPL CALC-SCNC: 15 MMOL/L — SIGNIFICANT CHANGE UP (ref 5–17)
APPEARANCE UR: ABNORMAL
APTT BLD: 32.6 SEC — SIGNIFICANT CHANGE UP (ref 27.5–36.3)
AST SERPL-CCNC: 16 U/L — SIGNIFICANT CHANGE UP
BACTERIA # UR AUTO: ABNORMAL
BASOPHILS # BLD AUTO: 0.02 K/UL — SIGNIFICANT CHANGE UP (ref 0–0.2)
BASOPHILS NFR BLD AUTO: 0.3 % — SIGNIFICANT CHANGE UP (ref 0–2)
BILIRUB SERPL-MCNC: 0.5 MG/DL — SIGNIFICANT CHANGE UP (ref 0.4–2)
BILIRUB UR-MCNC: ABNORMAL
BLD GP AB SCN SERPL QL: SIGNIFICANT CHANGE UP
BUN SERPL-MCNC: 10 MG/DL — SIGNIFICANT CHANGE UP (ref 8–20)
CALCIUM SERPL-MCNC: 9.4 MG/DL — SIGNIFICANT CHANGE UP (ref 8.6–10.2)
CHLORIDE SERPL-SCNC: 96 MMOL/L — LOW (ref 98–107)
CO2 SERPL-SCNC: 28 MMOL/L — SIGNIFICANT CHANGE UP (ref 22–29)
COD CRY URNS QL: ABNORMAL
COLOR SPEC: YELLOW — SIGNIFICANT CHANGE UP
CREAT SERPL-MCNC: 0.8 MG/DL — SIGNIFICANT CHANGE UP (ref 0.5–1.3)
DIFF PNL FLD: ABNORMAL
EOSINOPHIL # BLD AUTO: 0.06 K/UL — SIGNIFICANT CHANGE UP (ref 0–0.5)
EOSINOPHIL NFR BLD AUTO: 1 % — SIGNIFICANT CHANGE UP (ref 0–6)
EPI CELLS # UR: SIGNIFICANT CHANGE UP
GLUCOSE SERPL-MCNC: 95 MG/DL — SIGNIFICANT CHANGE UP (ref 70–99)
GLUCOSE UR QL: NEGATIVE MG/DL — SIGNIFICANT CHANGE UP
HCT VFR BLD CALC: 49.2 % — SIGNIFICANT CHANGE UP (ref 39–50)
HGB BLD-MCNC: 16.4 G/DL — SIGNIFICANT CHANGE UP (ref 13–17)
IMM GRANULOCYTES NFR BLD AUTO: 0.2 % — SIGNIFICANT CHANGE UP (ref 0–1.5)
INR BLD: 1.08 RATIO — SIGNIFICANT CHANGE UP (ref 0.88–1.16)
KETONES UR-MCNC: ABNORMAL
LEUKOCYTE ESTERASE UR-ACNC: ABNORMAL
LIDOCAIN IGE QN: 135 U/L — HIGH (ref 22–51)
LYMPHOCYTES # BLD AUTO: 1.32 K/UL — SIGNIFICANT CHANGE UP (ref 1–3.3)
LYMPHOCYTES # BLD AUTO: 22.9 % — SIGNIFICANT CHANGE UP (ref 13–44)
MCHC RBC-ENTMCNC: 29.3 PG — SIGNIFICANT CHANGE UP (ref 27–34)
MCHC RBC-ENTMCNC: 33.3 GM/DL — SIGNIFICANT CHANGE UP (ref 32–36)
MCV RBC AUTO: 88 FL — SIGNIFICANT CHANGE UP (ref 80–100)
MONOCYTES # BLD AUTO: 0.59 K/UL — SIGNIFICANT CHANGE UP (ref 0–0.9)
MONOCYTES NFR BLD AUTO: 10.2 % — SIGNIFICANT CHANGE UP (ref 2–14)
NEUTROPHILS # BLD AUTO: 3.77 K/UL — SIGNIFICANT CHANGE UP (ref 1.8–7.4)
NEUTROPHILS NFR BLD AUTO: 65.4 % — SIGNIFICANT CHANGE UP (ref 43–77)
NITRITE UR-MCNC: NEGATIVE — SIGNIFICANT CHANGE UP
OB PNL STL: NEGATIVE — SIGNIFICANT CHANGE UP
PH UR: 6 — SIGNIFICANT CHANGE UP (ref 5–8)
PLATELET # BLD AUTO: 254 K/UL — SIGNIFICANT CHANGE UP (ref 150–400)
POTASSIUM SERPL-MCNC: 3.8 MMOL/L — SIGNIFICANT CHANGE UP (ref 3.5–5.3)
POTASSIUM SERPL-SCNC: 3.8 MMOL/L — SIGNIFICANT CHANGE UP (ref 3.5–5.3)
PROT SERPL-MCNC: 7.6 G/DL — SIGNIFICANT CHANGE UP (ref 6.6–8.7)
PROT UR-MCNC: 30 MG/DL
PROTHROM AB SERPL-ACNC: 12.2 SEC — SIGNIFICANT CHANGE UP (ref 10–12.9)
RBC # BLD: 5.59 M/UL — SIGNIFICANT CHANGE UP (ref 4.2–5.8)
RBC # FLD: 13 % — SIGNIFICANT CHANGE UP (ref 10.3–14.5)
RBC CASTS # UR COMP ASSIST: ABNORMAL /HPF (ref 0–4)
SODIUM SERPL-SCNC: 139 MMOL/L — SIGNIFICANT CHANGE UP (ref 135–145)
SP GR SPEC: 1.02 — SIGNIFICANT CHANGE UP (ref 1.01–1.02)
UROBILINOGEN FLD QL: 8 MG/DL
WBC # BLD: 5.77 K/UL — SIGNIFICANT CHANGE UP (ref 3.8–10.5)
WBC # FLD AUTO: 5.77 K/UL — SIGNIFICANT CHANGE UP (ref 3.8–10.5)
WBC UR QL: SIGNIFICANT CHANGE UP

## 2020-05-22 RX ORDER — SUCRALFATE 1 G
1 TABLET ORAL
Qty: 28 | Refills: 0
Start: 2020-05-22 | End: 2020-05-28

## 2020-05-22 RX ORDER — SODIUM CHLORIDE 9 MG/ML
1000 INJECTION INTRAMUSCULAR; INTRAVENOUS; SUBCUTANEOUS ONCE
Refills: 0 | Status: COMPLETED | OUTPATIENT
Start: 2020-05-22 | End: 2020-05-22

## 2020-05-22 RX ORDER — PANTOPRAZOLE SODIUM 20 MG/1
1 TABLET, DELAYED RELEASE ORAL
Qty: 14 | Refills: 0
Start: 2020-05-22 | End: 2020-06-04

## 2020-05-22 RX ORDER — PANTOPRAZOLE SODIUM 20 MG/1
40 TABLET, DELAYED RELEASE ORAL ONCE
Refills: 0 | Status: COMPLETED | OUTPATIENT
Start: 2020-05-22 | End: 2020-05-22

## 2020-05-22 RX ADMIN — SODIUM CHLORIDE 1000 MILLILITER(S): 9 INJECTION INTRAMUSCULAR; INTRAVENOUS; SUBCUTANEOUS at 17:48

## 2020-05-22 RX ADMIN — PANTOPRAZOLE SODIUM 40 MILLIGRAM(S): 20 TABLET, DELAYED RELEASE ORAL at 17:48

## 2020-05-22 NOTE — ED STATDOCS - OBJECTIVE STATEMENT
56 y/o M pt with no significant PMHx presents to the ED c/o abdominal pain x 3 days. Associated symptoms include black tarry stool, and blood in stool. Patient states that he has an ulcer, which is causing his pain. He had a similar episode 3 years ago, and was evaluated for it, and had it cauterized. He had an endoscopy performed, and was prompted to follow up with a GI specialist, but never got re-evaluated. Patient recalls that he was also placed on a medication for the ulcer, but is unsure of the name. Current drinker. Non smoker. NKDA. No SHx. No AC use or Motrin use. Denies fever, chills, cough, vomiting, CP, difficulty breathing. No further acute complaints at this time.   : Annie

## 2020-05-22 NOTE — ED STATDOCS - NSFOLLOWUPINSTRUCTIONS_ED_ALL_ED_FT
Abdominal Pain    Many things can cause abdominal pain. Many times, abdominal pain is not caused by a disease and will improve without treatment. Your health care provider will do a physical exam to determine if there is a dangerous cause of your pain; blood tests and imaging may help determine the cause of your pain. However, in many cases, no cause may be found and you may need further testing as an outpatient. Monitor your abdominal pain for any changes.     SEEK IMMEDIATE MEDICAL CARE IF YOU HAVE ANY OF THE FOLLOWING SYMPTOMS: worsening abdominal pain, uncontrollable vomiting, profuse diarrhea, inability to have bowel movements or pass gas, black or bloody stools, fever accompanying chest pain or back pain, or fainting. These symptoms may represent a serious problem that is an emergency. Do not wait to see if the symptoms will go away. Get medical help right away. Call 911 and do not drive yourself to the hospital.     Please follow up with gastroenterology within 5 days

## 2020-05-22 NOTE — ED STATDOCS - PATIENT PORTAL LINK FT
You can access the FollowMyHealth Patient Portal offered by [facility] by registering at the following website: [email or web address]. By joining [employer]’s FollowMyHealth portal, you will also be able to view your health information using other applications (apps) compatible with our system.

## 2020-05-22 NOTE — ED STATDOCS - CLINICAL SUMMARY MEDICAL DECISION MAKING FREE TEXT BOX
Patient presents with abdominal pain, he thinks it is an ulcer, but has RLQ pain on exam. Will obtain blood work, CT scan, UA, IV hydration, give protonix, and reassess.

## 2020-05-22 NOTE — ED STATDOCS - NS ED ROS FT
Review of Systems  •	CONSTITUTIONAL - no  fever, no diaphoresis, no weight change  •	SKIN - no rash  •	HEMATOLOGIC - no bleeding, no bruising  •	EYES - no eye pain, no blurred vision  •	ENT - no change in hearing, no pain  •	RESPIRATORY - no shortness of breath, no cough  •	CARDIAC - no chest pain, no palpitations  •	GI - (+) abd pain, no nausea, no vomiting, no diarrhea, no constipation, (+) bleeding (+) black tarry stool  •	GENITO-URINARY - no discharge, no dysuria; no hematuria,   •	ENDO - no polydypsia, no polyurea, no heat/no cold intolerance  •	MUSCULOSKELETAL - no joint pain, no swelling, no redness  •	NEUROLOGIC - no weakness, no headache, no anesthesia, no paresthesias  •	PSYCH - no anxiety, non suicidal, non homicidal, no hallucination, no depression

## 2020-05-22 NOTE — ED ADULT NURSE NOTE - NSIMPLEMENTINTERV_GEN_ALL_ED
Implemented All Universal Safety Interventions:  [place] to call system. Call bell, personal items and telephone within reach. Instruct patient to call for assistance. Room bathroom lighting operational. Non-slip footwear when patient is off stretcher. Physically safe environment: no spills, clutter or unnecessary equipment. Stretcher in lowest position, wheels locked, appropriate side rails in place.

## 2020-05-22 NOTE — ED STATDOCS - PHYSICAL EXAMINATION
VITAL SIGNS: I have reviewed nursing notes and confirm.  CONSTITUTIONAL: Well-developed; well-nourished; in no acute distress.  SKIN: Skin exam is warm and dry, no acute rash.  HEAD: Normocephalic; atraumatic.  EYES: PERRL, EOM intact; conjunctiva and sclera clear.  ENT: No nasal discharge; airway clear. Throat clear.  NECK: Supple; non tender.    CARD: S1, S2 normal; no murmurs, gallops, or rubs. Regular rate and rhythm.  RESP: No wheezes,  no rales or rhonchi.   ABD:  soft; non-distended; non-tender;   EXT: Normal ROM. No clubbing, cyanosis or edema.   NEURO: Alert, oriented. Grossly unremarkable. No focal deficits. no facial droop, moves all extremities  PSYCH: Cooperative, appropriate.  RECTAL EXAM: brown stool, no gross blood, good rectal tone  *chaperoned by Shaheen VITAL SIGNS: I have reviewed nursing notes and confirm.  CONSTITUTIONAL: Well-developed; well-nourished; in no acute distress.  SKIN: Skin exam is warm and dry, no acute rash.  HEAD: Normocephalic; atraumatic.  EYES: PERRL, EOM intact; conjunctiva and sclera clear.  ENT: No nasal discharge; airway clear. Throat clear.  NECK: Supple; non tender.    CARD: S1, S2 normal; no murmurs, gallops, or rubs. Regular rate and rhythm.  RESP: No wheezes,  no rales or rhonchi.   ABD: (+) RLQ tenderness to palpation   EXT: Normal ROM. No clubbing, cyanosis or edema.   NEURO: Alert, oriented. Grossly unremarkable. No focal deficits. no facial droop, moves all extremities  PSYCH: Cooperative, appropriate.  RECTAL EXAM: brown stool, no gross blood, good rectal tone  *chaperoned by Shaheen

## 2020-05-22 NOTE — ED STATDOCS - ATTENDING CONTRIBUTION TO CARE
I, Aden Damon, performed the initial face to face bedside interview with this patient regarding history of present illness, review of symptoms and relevant past medical, social and family history.  I completed an independent physical examination.  I was the initial provider who evaluated this patient. I have signed out the follow up of any pending tests (i.e. labs, radiological studies) to the ACP.  I have communicated the patient’s plan of care and disposition with the ACP.

## 2020-05-22 NOTE — ED STATDOCS - PROGRESS NOTE DETAILS
PT evaluated by intake physician. HPI/ROS/PE as noted above. Will follow up plan per intake physician ( Lora) Discussed CT results with PT and need for follow up with GI to further evaluate abnormal CT results. Abdomen is soft non tender at time of discharge. PT tolerating PO

## 2020-05-22 NOTE — ED STATDOCS - CARE PROVIDER_API CALL
Mohinder Peterson J  GASTROENTEROLOGY  [address] [place] [address]  Phone: [phone]  Fax: [phone]  Follow Up Time:

## 2020-05-22 NOTE — ED ADULT NURSE NOTE - OBJECTIVE STATEMENT
Pt A&OX3, amb ad dann, c/o abd pain, and nausea X 3 days/  Today pt noticed blood in stool.  Pt states he has history of gastric ulcer.  Abd soft nondistended, nontender, moving all ext well.

## 2020-05-22 NOTE — ED ADULT TRIAGE NOTE - CHIEF COMPLAINT QUOTE
pt came to ED from home c/o abdominal pain, N/V and blood in his stool x 3 days. Pt states this has happened in the past with no outcome but states "I know I have an ulcer." Pt denies any chest pain, SOB, fevers,  symptoms. No known covid exposure. Respirations even and unlabored.

## 2020-07-28 NOTE — ED ADULT NURSE REASSESSMENT NOTE - NS ED NURSE REASSESS COMMENT FT1
Addended by: PRACHI LIM on: 7/28/2020 12:19 PM     Modules accepted: Orders     ICU to ED tool filled out, report given to receiving RN Lora, pt placed on portable monitor, preparing for transport to floor

## 2021-08-22 ENCOUNTER — EMERGENCY (EMERGENCY)
Facility: HOSPITAL | Age: 56
LOS: 1 days | Discharge: HOME | End: 2021-08-22
Attending: EMERGENCY MEDICINE
Payer: SELF-PAY

## 2021-08-22 VITALS
TEMPERATURE: 99 F | WEIGHT: 143.3 LBS | OXYGEN SATURATION: 97 % | RESPIRATION RATE: 18 BRPM | HEIGHT: 73 IN | DIASTOLIC BLOOD PRESSURE: 81 MMHG | HEART RATE: 80 BPM | SYSTOLIC BLOOD PRESSURE: 123 MMHG

## 2021-08-22 LAB
ALBUMIN SERPL ELPH-MCNC: 4.5 G/DL — SIGNIFICANT CHANGE UP (ref 3.3–5.2)
ALP SERPL-CCNC: 79 U/L — SIGNIFICANT CHANGE UP (ref 40–120)
ALT FLD-CCNC: 9 U/L — SIGNIFICANT CHANGE UP
ANION GAP SERPL CALC-SCNC: 11 MMOL/L — SIGNIFICANT CHANGE UP (ref 5–17)
APPEARANCE UR: CLEAR — SIGNIFICANT CHANGE UP
AST SERPL-CCNC: 18 U/L — SIGNIFICANT CHANGE UP
BASOPHILS # BLD AUTO: 0.01 K/UL — SIGNIFICANT CHANGE UP (ref 0–0.2)
BASOPHILS NFR BLD AUTO: 0.1 % — SIGNIFICANT CHANGE UP (ref 0–2)
BILIRUB SERPL-MCNC: 0.5 MG/DL — SIGNIFICANT CHANGE UP (ref 0.4–2)
BILIRUB UR-MCNC: ABNORMAL
BUN SERPL-MCNC: 8.5 MG/DL — SIGNIFICANT CHANGE UP (ref 8–20)
CALCIUM SERPL-MCNC: 9.2 MG/DL — SIGNIFICANT CHANGE UP (ref 8.6–10.2)
CHLORIDE SERPL-SCNC: 99 MMOL/L — SIGNIFICANT CHANGE UP (ref 98–107)
CO2 SERPL-SCNC: 29 MMOL/L — SIGNIFICANT CHANGE UP (ref 22–29)
COLOR SPEC: YELLOW — SIGNIFICANT CHANGE UP
CREAT SERPL-MCNC: 0.74 MG/DL — SIGNIFICANT CHANGE UP (ref 0.5–1.3)
DIFF PNL FLD: ABNORMAL
EOSINOPHIL # BLD AUTO: 0.05 K/UL — SIGNIFICANT CHANGE UP (ref 0–0.5)
EOSINOPHIL NFR BLD AUTO: 0.7 % — SIGNIFICANT CHANGE UP (ref 0–6)
EPI CELLS # UR: SIGNIFICANT CHANGE UP
GLUCOSE SERPL-MCNC: 105 MG/DL — HIGH (ref 70–99)
GLUCOSE UR QL: NEGATIVE MG/DL — SIGNIFICANT CHANGE UP
HCT VFR BLD CALC: 49.7 % — SIGNIFICANT CHANGE UP (ref 39–50)
HGB BLD-MCNC: 16.6 G/DL — SIGNIFICANT CHANGE UP (ref 13–17)
IMM GRANULOCYTES NFR BLD AUTO: 0.4 % — SIGNIFICANT CHANGE UP (ref 0–1.5)
KETONES UR-MCNC: ABNORMAL
LEUKOCYTE ESTERASE UR-ACNC: ABNORMAL
LIDOCAIN IGE QN: 95 U/L — HIGH (ref 22–51)
LYMPHOCYTES # BLD AUTO: 1.3 K/UL — SIGNIFICANT CHANGE UP (ref 1–3.3)
LYMPHOCYTES # BLD AUTO: 17.5 % — SIGNIFICANT CHANGE UP (ref 13–44)
MCHC RBC-ENTMCNC: 28.9 PG — SIGNIFICANT CHANGE UP (ref 27–34)
MCHC RBC-ENTMCNC: 33.4 GM/DL — SIGNIFICANT CHANGE UP (ref 32–36)
MCV RBC AUTO: 86.6 FL — SIGNIFICANT CHANGE UP (ref 80–100)
MONOCYTES # BLD AUTO: 0.52 K/UL — SIGNIFICANT CHANGE UP (ref 0–0.9)
MONOCYTES NFR BLD AUTO: 7 % — SIGNIFICANT CHANGE UP (ref 2–14)
NEUTROPHILS # BLD AUTO: 5.51 K/UL — SIGNIFICANT CHANGE UP (ref 1.8–7.4)
NEUTROPHILS NFR BLD AUTO: 74.3 % — SIGNIFICANT CHANGE UP (ref 43–77)
NITRITE UR-MCNC: NEGATIVE — SIGNIFICANT CHANGE UP
PH UR: 7 — SIGNIFICANT CHANGE UP (ref 5–8)
PLATELET # BLD AUTO: 263 K/UL — SIGNIFICANT CHANGE UP (ref 150–400)
POTASSIUM SERPL-MCNC: 3.9 MMOL/L — SIGNIFICANT CHANGE UP (ref 3.5–5.3)
POTASSIUM SERPL-SCNC: 3.9 MMOL/L — SIGNIFICANT CHANGE UP (ref 3.5–5.3)
PROT SERPL-MCNC: 7.6 G/DL — SIGNIFICANT CHANGE UP (ref 6.6–8.7)
PROT UR-MCNC: 30 MG/DL
RBC # BLD: 5.74 M/UL — SIGNIFICANT CHANGE UP (ref 4.2–5.8)
RBC # FLD: 13.1 % — SIGNIFICANT CHANGE UP (ref 10.3–14.5)
RBC CASTS # UR COMP ASSIST: SIGNIFICANT CHANGE UP /HPF (ref 0–4)
SODIUM SERPL-SCNC: 139 MMOL/L — SIGNIFICANT CHANGE UP (ref 135–145)
SP GR SPEC: 1.01 — SIGNIFICANT CHANGE UP (ref 1.01–1.02)
UROBILINOGEN FLD QL: 1 MG/DL
WBC # BLD: 7.42 K/UL — SIGNIFICANT CHANGE UP (ref 3.8–10.5)
WBC # FLD AUTO: 7.42 K/UL — SIGNIFICANT CHANGE UP (ref 3.8–10.5)
WBC UR QL: SIGNIFICANT CHANGE UP

## 2021-08-22 RX ORDER — ACETAMINOPHEN 500 MG
650 TABLET ORAL ONCE
Refills: 0 | Status: COMPLETED | OUTPATIENT
Start: 2021-08-22 | End: 2021-08-22

## 2021-08-22 RX ORDER — FAMOTIDINE 10 MG/ML
20 INJECTION INTRAVENOUS ONCE
Refills: 0 | Status: COMPLETED | OUTPATIENT
Start: 2021-08-22 | End: 2021-08-22

## 2021-08-22 RX ORDER — ONDANSETRON 8 MG/1
4 TABLET, FILM COATED ORAL ONCE
Refills: 0 | Status: COMPLETED | OUTPATIENT
Start: 2021-08-22 | End: 2021-08-22

## 2021-08-22 RX ORDER — SODIUM CHLORIDE 9 MG/ML
1000 INJECTION INTRAMUSCULAR; INTRAVENOUS; SUBCUTANEOUS ONCE
Refills: 0 | Status: COMPLETED | OUTPATIENT
Start: 2021-08-22 | End: 2021-08-22

## 2021-08-22 RX ADMIN — Medication 650 MILLIGRAM(S): at 21:33

## 2021-08-22 RX ADMIN — FAMOTIDINE 20 MILLIGRAM(S): 10 INJECTION INTRAVENOUS at 21:33

## 2021-08-22 RX ADMIN — ONDANSETRON 4 MILLIGRAM(S): 8 TABLET, FILM COATED ORAL at 21:33

## 2021-08-22 RX ADMIN — SODIUM CHLORIDE 1000 MILLILITER(S): 9 INJECTION INTRAMUSCULAR; INTRAVENOUS; SUBCUTANEOUS at 21:32

## 2021-08-22 NOTE — ED STATDOCS - OBJECTIVE STATEMENT
55 y/o male with PMHx of PUD presents to ED c/o abdominal pain. Patient reports abdominal pain with vomiting and decreased P.O since last night. Patient has had no BM or gas passed from below since last night. Patient denies fever, urinary symptoms, surgical history, bloody emesis, chest pain, shortness of breath or any other complaints.

## 2021-08-22 NOTE — ED STATDOCS - ATTENDING CONTRIBUTION TO CARE
Sarmad: I performed a face to face bedside interview with patient regarding history of present illness, review of symptoms and past medical history. I completed an independent physical exam and ordered tests/medications as needed.  I have discussed patient's plan of care with advanced care provider. The advanced care provider assisted in  executing the discussed plan. I was available for any questions or issues that may have arose during the execution of the plan of care.

## 2021-08-22 NOTE — ED STATDOCS - PHYSICAL EXAMINATION
Gen: No acute distress, non toxic  HEENT: Mucous membranes moist, pink conjunctivae, EOMI  CV: RRR, nl s1/s2.  Resp: CTAB, normal rate and effort  GI: Abdomen soft, generalized abdominal TTP, mild distension. Negative Hein's. No rebound, no guarding  : No CVAT  Neuro: A&O x 3, moving all 4 extremities  MSK: No spine or joint tenderness to palpation  Skin: No rashes. intact and perfused.    abdominal generalized TTP, mild distension. Negative Hien's.

## 2021-08-22 NOTE — ED STATDOCS - PATIENT PORTAL LINK FT
You can access the FollowMyHealth Patient Portal offered by [facility] by registering at the following website: [email or web address]. By joining [employer]’s FollowMyHealth portal, you will also be able to view your health information using other applications (apps) compatible with our system.

## 2021-08-22 NOTE — ED STATDOCS - PROGRESS NOTE DETAILS
JUAN HUGHES: PT evaluated by intake physician. HPI/PE/ROS as noted above. Will follow up plan per intake physician     no acute findings on CT   advised on fu with PMD

## 2021-08-22 NOTE — ED STATDOCS - CARE PROVIDER_API CALL
Girish Shepherd)  Urology  [address] [place] [address]  Phone: [phone]  Fax: [phone]  Follow Up Time: 7-10 Days

## 2021-08-22 NOTE — ED STATDOCS - NSFOLLOWUPINSTRUCTIONS_ED_ALL_ED_FT
Si usted no tiene un médico de primaria por favor llame para hacer angelito marbella en cualquiera de las clínicas de atención primaria que se enumeran a continuación:    [facility]  [address] [place] [address]  Phone: [phone]    [place] [address]  Phone: [phone]     Abdominal Pain    Many things can cause abdominal pain. Many times, abdominal pain is not caused by a disease and will improve without treatment. Your health care provider will do a physical exam to determine if there is a dangerous cause of your pain; blood tests and imaging may help determine the cause of your pain. However, in many cases, no cause may be found and you may need further testing as an outpatient. Monitor your abdominal pain for any changes.     SEEK IMMEDIATE MEDICAL CARE IF YOU HAVE ANY OF THE FOLLOWING SYMPTOMS: worsening abdominal pain, uncontrollable vomiting, profuse diarrhea, inability to have bowel movements or pass gas, black or bloody stools, fever accompanying chest pain or back pain, or fainting. These symptoms may represent a serious problem that is an emergency. Do not wait to see if the symptoms will go away. Get medical help right away. Call 911 and do not drive yourself to the hospital.

## 2021-08-24 ENCOUNTER — EMERGENCY (EMERGENCY)
Facility: HOSPITAL | Age: 56
LOS: 1 days | Discharge: HOME | End: 2021-08-24
Attending: EMERGENCY MEDICINE
Payer: SELF-PAY

## 2021-08-24 VITALS
TEMPERATURE: 99 F | SYSTOLIC BLOOD PRESSURE: 114 MMHG | OXYGEN SATURATION: 97 % | WEIGHT: 160.28 LBS | HEIGHT: 73 IN | DIASTOLIC BLOOD PRESSURE: 78 MMHG | RESPIRATION RATE: 20 BRPM | HEART RATE: 78 BPM

## 2021-08-24 LAB
ALBUMIN SERPL ELPH-MCNC: 4 G/DL — SIGNIFICANT CHANGE UP (ref 3.3–5.2)
ALP SERPL-CCNC: 75 U/L — SIGNIFICANT CHANGE UP (ref 40–120)
ALT FLD-CCNC: 7 U/L — SIGNIFICANT CHANGE UP
ANION GAP SERPL CALC-SCNC: 14 MMOL/L — SIGNIFICANT CHANGE UP (ref 5–17)
AST SERPL-CCNC: 15 U/L — SIGNIFICANT CHANGE UP
BASOPHILS # BLD AUTO: 0.02 K/UL — SIGNIFICANT CHANGE UP (ref 0–0.2)
BASOPHILS NFR BLD AUTO: 0.3 % — SIGNIFICANT CHANGE UP (ref 0–2)
BILIRUB SERPL-MCNC: 0.4 MG/DL — SIGNIFICANT CHANGE UP (ref 0.4–2)
BUN SERPL-MCNC: 8.5 MG/DL — SIGNIFICANT CHANGE UP (ref 8–20)
CALCIUM SERPL-MCNC: 8.9 MG/DL — SIGNIFICANT CHANGE UP (ref 8.6–10.2)
CHLORIDE SERPL-SCNC: 104 MMOL/L — SIGNIFICANT CHANGE UP (ref 98–107)
CO2 SERPL-SCNC: 22 MMOL/L — SIGNIFICANT CHANGE UP (ref 22–29)
CREAT SERPL-MCNC: 0.74 MG/DL — SIGNIFICANT CHANGE UP (ref 0.5–1.3)
CULTURE RESULTS: NO GROWTH — SIGNIFICANT CHANGE UP
EOSINOPHIL # BLD AUTO: 0.04 K/UL — SIGNIFICANT CHANGE UP (ref 0–0.5)
EOSINOPHIL NFR BLD AUTO: 0.6 % — SIGNIFICANT CHANGE UP (ref 0–6)
GLUCOSE SERPL-MCNC: 108 MG/DL — HIGH (ref 70–99)
HCT VFR BLD CALC: 46.1 % — SIGNIFICANT CHANGE UP (ref 39–50)
HGB BLD-MCNC: 15.7 G/DL — SIGNIFICANT CHANGE UP (ref 13–17)
IMM GRANULOCYTES NFR BLD AUTO: 0.2 % — SIGNIFICANT CHANGE UP (ref 0–1.5)
LIDOCAIN IGE QN: 51 U/L — SIGNIFICANT CHANGE UP (ref 22–51)
LYMPHOCYTES # BLD AUTO: 1.44 K/UL — SIGNIFICANT CHANGE UP (ref 1–3.3)
LYMPHOCYTES # BLD AUTO: 23 % — SIGNIFICANT CHANGE UP (ref 13–44)
MCHC RBC-ENTMCNC: 28.8 PG — SIGNIFICANT CHANGE UP (ref 27–34)
MCHC RBC-ENTMCNC: 34.1 GM/DL — SIGNIFICANT CHANGE UP (ref 32–36)
MCV RBC AUTO: 84.6 FL — SIGNIFICANT CHANGE UP (ref 80–100)
MONOCYTES # BLD AUTO: 0.5 K/UL — SIGNIFICANT CHANGE UP (ref 0–0.9)
MONOCYTES NFR BLD AUTO: 8 % — SIGNIFICANT CHANGE UP (ref 2–14)
NEUTROPHILS # BLD AUTO: 4.25 K/UL — SIGNIFICANT CHANGE UP (ref 1.8–7.4)
NEUTROPHILS NFR BLD AUTO: 67.9 % — SIGNIFICANT CHANGE UP (ref 43–77)
PLATELET # BLD AUTO: 267 K/UL — SIGNIFICANT CHANGE UP (ref 150–400)
POTASSIUM SERPL-MCNC: 4.1 MMOL/L — SIGNIFICANT CHANGE UP (ref 3.5–5.3)
POTASSIUM SERPL-SCNC: 4.1 MMOL/L — SIGNIFICANT CHANGE UP (ref 3.5–5.3)
PROT SERPL-MCNC: 7.5 G/DL — SIGNIFICANT CHANGE UP (ref 6.6–8.7)
RBC # BLD: 5.45 M/UL — SIGNIFICANT CHANGE UP (ref 4.2–5.8)
RBC # FLD: 13.2 % — SIGNIFICANT CHANGE UP (ref 10.3–14.5)
SODIUM SERPL-SCNC: 140 MMOL/L — SIGNIFICANT CHANGE UP (ref 135–145)
SPECIMEN SOURCE: SIGNIFICANT CHANGE UP
WBC # BLD: 6.26 K/UL — SIGNIFICANT CHANGE UP (ref 3.8–10.5)
WBC # FLD AUTO: 6.26 K/UL — SIGNIFICANT CHANGE UP (ref 3.8–10.5)

## 2021-08-24 RX ORDER — PANTOPRAZOLE SODIUM 20 MG/1
40 TABLET, DELAYED RELEASE ORAL ONCE
Refills: 0 | Status: COMPLETED | OUTPATIENT
Start: 2021-08-24 | End: 2021-08-24

## 2021-08-24 RX ORDER — ONDANSETRON 8 MG/1
4 TABLET, FILM COATED ORAL ONCE
Refills: 0 | Status: COMPLETED | OUTPATIENT
Start: 2021-08-24 | End: 2021-08-24

## 2021-08-24 RX ORDER — MORPHINE SULFATE 50 MG/1
4 CAPSULE, EXTENDED RELEASE ORAL ONCE
Refills: 0 | Status: DISCONTINUED | OUTPATIENT
Start: 2021-08-24 | End: 2021-08-24

## 2021-08-24 RX ORDER — SODIUM CHLORIDE 9 MG/ML
1000 INJECTION INTRAMUSCULAR; INTRAVENOUS; SUBCUTANEOUS ONCE
Refills: 0 | Status: COMPLETED | OUTPATIENT
Start: 2021-08-24 | End: 2021-08-24

## 2021-08-24 RX ADMIN — Medication 30 MILLILITER(S): at 10:59

## 2021-08-24 RX ADMIN — MORPHINE SULFATE 4 MILLIGRAM(S): 50 CAPSULE, EXTENDED RELEASE ORAL at 10:59

## 2021-08-24 RX ADMIN — SODIUM CHLORIDE 1000 MILLILITER(S): 9 INJECTION INTRAMUSCULAR; INTRAVENOUS; SUBCUTANEOUS at 11:00

## 2021-08-24 RX ADMIN — ONDANSETRON 4 MILLIGRAM(S): 8 TABLET, FILM COATED ORAL at 10:59

## 2021-08-24 RX ADMIN — PANTOPRAZOLE SODIUM 40 MILLIGRAM(S): 20 TABLET, DELAYED RELEASE ORAL at 10:59

## 2021-08-24 NOTE — ED STATDOCS - PATIENT PORTAL LINK FT
You can access the FollowMyHealth Patient Portal offered by [facility] by registering at the following website: [email or web address]. By joining [employer]’s FollowMyHealth portal, you will also be able to view your health information using other applications (apps) compatible with our system.

## 2021-08-24 NOTE — ED ADULT NURSE REASSESSMENT NOTE - NS ED NURSE REASSESS COMMENT FT1
pt was seen here Friday for same.   returns today  (sister dropped off) with RIght side abd pain.   medicated as ordered MD at bedside

## 2021-08-24 NOTE — ED PROVIDER NOTE - PATIENT PORTAL LINK FT
You can access the FollowMyHealth Patient Portal offered by [facility] by registering at the following website: [email or web address]. By joining [employer]’s FollowMyHealth portal, you will also be able to view your health information using other applications (apps) compatible with our system.

## 2021-08-24 NOTE — ED PROVIDER NOTE - OBJECTIVE STATEMENT
- pt returns to the ER for mid abdo pain. Was seen in the ER Sunday night for pain that started Saturday night throughout the night and into Sunday, associated with 4 episodes of non bloody vomiting. No BM since Saturday. Hx of ulcer without any medication. Labs urine and CT scan were done with no significant findings. Pain was only slight on Monday and returned today again more severe with one episode of vomiting today. Denies and fevers, radiation of pain, urinary symptoms no blood in urine

## 2021-08-24 NOTE — ED PROVIDER NOTE - NSCAREINITIATED _GEN_ER
MENDEZ for patient to call back. I still have his Physician's Certification form. Would he like us to mail them to him or fax them? We would need a fax number and name.    Patient missed today's appointment . Does he want to reschedule?   Yo Alanis(Attending)

## 2023-11-29 NOTE — DISCHARGE NOTE ADULT - HOSPITAL COURSE
Medicare Annual Wellness Visit    Enrrique Day is here for Medicare AWV    Assessment & Plan   Medicare annual wellness visit, subsequent  Acquired hypothyroidism  Arthritis, multiple joint involvement  Recommendations for Preventive Services Due: see orders and patient instructions/AVS.  Recommended screening schedule for the next 5-10 years is provided to the patient in written form: see Patient Instructions/AVS.     Return in about 6 months (around 5/28/2024), or if symptoms worsen or fail to improve. Subjective   The following acute and/or chronic problems were also addressed today:    1. Medicare annual wellness visit, subsequent      Health Maintenance   Topic Date Due    Respiratory Syncytial Virus (RSV) age 61 yrs+ (3 - 3-dose 60+ series) Never done    A1C test (Diabetic or Prediabetic)  10/02/2024    Depression Screen  11/25/2024    Annual Wellness Visit (AWV)  11/28/2024    Breast cancer screen  12/13/2024    Colorectal Cancer Screen  12/21/2025    DTaP/Tdap/Td vaccine (3 - Td or Tdap) 03/31/2026    Lipids  05/24/2027    DEXA (modify frequency per FRAX score)  Completed    Flu vaccine  Completed    Shingles vaccine  Completed    Pneumococcal 65+ years Vaccine  Completed    COVID-19 Vaccine  Completed    Hepatitis C screen  Completed    Hepatitis A vaccine  Aged Out    Hepatitis B vaccine  Aged Out    Hib vaccine  Aged Out    Meningococcal (ACWY) vaccine  Aged Out     Patient Care Team:  Elizabeth Greene MD as PCP - General (Family Medicine)  Elizabeth Greene MD as PCP - Empaneled Provider  Thelma Cedillo MD as Consulting Physician (Sleep Medicine Family Practice)     Age-appropriate preventive healthcare recommendations discussed with the patient. Health maintenance reviewed and updated as follows.   Discussed routine and annual follow-ups with dermatologist, ophthalmologist, dentist and other care team providers in her healthcare team.     2. Acquired hypothyroidism    Stable, currently well 52 year old male who's only PMH x is of peptic ulcer which was diagnosed 6 years ago presented to ER with a 6 hour history of vomiting bright red blood. Admitted to ICU for severe GI Bleed on PPI Drip. GI consulted, had EGD which showed showed gastric ulcer. Transferred to Hospitalist service 11/23    Upper GI Bleed:  from Gastric Ulcer   GI consulted, S/P EGD showed larger gastric Ulcer with hemorrhage and Babita Garrett tear  Initially started  PPI Q 12  advance diet today  Needs follow up in EGD in 6 weeks to make sure his Gastric ulcer has healed completely. follow up with Dr White in 6 weeks    Acute Blood Loss related anemia : from Upper GI Bleed  S/P 1 U PRBC 1/23  repeat Hb in am    please follow up with Gastroenterologist Dr White in 6 weeks and have follow up EGD to make sure Gastric Ulcer has healed completely. Please take Medications as adviced.   Please follow up with your family Doctor in 2 weeks. Check CBC to make sure Hemoglobin is stable    Total time spent in discharge 45 Minutes, More than 50 % time spent in counseling/cordination of care 52 year old male who's only PMH x is of peptic ulcer which was diagnosed 6 years ago presented to ER with a 6 hour history of vomiting bright red blood. Admitted to ICU for severe GI Bleed on PPI Drip. GI consulted, had EGD which showed showed gastric ulcer. Transferred to Hospitalist service 11/23    Upper GI Bleed:  from Acute Gastric Ulcer with hemorrhage   GI consulted, S/P EGD showed larger gastric Ulcer with hemorrhage and Babita Garertt tear  Initially started  PPI  drip, subsequently transitioned to PPI PO Q 12 and also D/C ed home on this regimen for 60 days   Tolerated advanced solid diet well before D/C   Needs follow up in EGD in 6 weeks to make sure his Gastric ulcer has healed completely. follow up with Dr White in 6 weeks    Acute Blood Loss related anemia : from Upper GI Bleed  S/P 1 U PRBC 1/23  repeat Hb remained stable at 8.6 at discharge  Path still pending at the time of D/C, Recommended follow up with GI    Patient with no Insurance. Social work and  consulted for Financial medication assistance and with their assistance, Pt got Protonix Medications for 60 day supply    please follow up with Gastroenterologist Dr White in 6 weeks and have follow up EGD to make sure Gastric Ulcer has healed completely. Please take Medications as adviced.   Please follow up with your family Doctor in 2 weeks. Check CBC to make sure Hemoglobin is stable    Total time spent in discharge 45 Minutes, More than 50 % time spent in counseling/cordination of care

## 2023-12-18 NOTE — DISCHARGE NOTE ADULT - NS AS DC STROKE DX YN
RE: EMSC reschedule for Puetz from 12-20  Received: Today  Melonie Juan Susan M  Pt has been rescheduled.          Previous Messages       ----- Message -----  From: Mary Hernandez  Sent: 12/18/2023   1:13 PM CST  To: Gastro Procedure Preauth Pool; *  Subject: EMSC reschedule for Puetz from 12-20            This patient's surgery with Dr. Jemal James was cancelled for 12-20 and has been rescheduled to 01-03.    Thanks, Nimco         Case Information    Patient: Kala Marc [[id number]]   Case:  [id number]      no

## 2024-03-20 NOTE — ED ADULT TRIAGE NOTE - HEART RATE (BEATS/MIN)
Speech Therapy      Visit Type: Evaluation  -  Video swallow  Reason for consult: ST ordered for patient admitted status post L2 vertebral body and pedicle fracture with T11-L4 posterior instrumented fusion requiring phenylephrine for hypotension.  Patient was admitted on March 12, 2024 as an ED to ED trauma transfer from [facility] for fall. Patient reporting chronic dysphagia s/p cervical spinal surgery.     Relevant History/Co-morbidities: history of HFrEF complicated by VT storm, AICD, atrial fibrillation, CVA right-sided deficits, COPD, hypertension, hyperlipidemia, prior C-spine injury status post C3-C6 Lami posterior fusion and C4-C7 anterior discectomy with fusion is admitted to the NCCU    Patient familiar with [facility] SLP services in 06/2023 with recommendations for minced/moist and thins.  Seen at OSH on 3/1/24 with recommendation for mechanical soft and thin liquid diet and recommended outpatient videoswallow.  Per pt this was never completed.    SUBJECTIVE  - Patient agreed to participate in therapy this date.    Patient received on cart in radiology suite. RN-Corinne present for entire study. Patient reports back and leg pain at today's encounter.     Functional Cognition:    - Expression is verbal.     - Following commands intact.      Affect/Behavior: alert and appropriate    Pain at onset of session:   RN informed on pain level.    -  8/10     - Location: back and leg      OBJECTIVE      Diet prior to visit: Liquid- Thin and Regular  - Dentition: intact      Video Fluoroscopy  Numbers listed with consistency indicate IDDSI diet level.  Completed in lateral view unless otherwise stated.     Thin (0); cup; straw; self fed; SLP fed    - Oral Phase: impaired, piecemeal swallow and premature spillage    - Pharyngeal Phase: impaired        Swallow Initiation: valleculae        Residue: trace valleculae and trace pyriform sinuses, Clearance: reduced with cued second swallow    - Penetration:         Amount: none    - Aspiration:        Amount: none    - Penetration Aspiration Scale: 2 - material enters the airway, remains above the vocal folds and is ejected from the airway  Patient with swallow initiation at the level of the valleculae. No oral residue appreciated, but trace pharyngeal residue appreciated. Slight UES distention observed at today's study that was grossly cleared with cued sequential swallows on all trials of thin liquids.   Trace airway penetration with partial to full clearance with swallow during sequential sips of thin liquid.    Pureed (4)    - Oral Phase: impaired, piecemeal swallow    - Pharyngeal Phase: impaired        Residue: trace valleculae and trace pyriform sinuses, Clearance: reduced with cued second swallow    - Penetration:  none    - Aspiration: none    - Penetration Aspiration Scale: 1 - material does not enter the airway  No oral residue appreciated, but trace pharyngeal residue appreciated. Patient with occasional piecemeal swallows on trials of puree.  No penetration or aspiration appreciated.     Regular    - Oral Phase: WFL, mastication    - Pharyngeal Phase: intact    - Penetration:         Amount: none    - Aspiration:         Amount: none    - Penetration Aspiration Scale: 1 - material does not enter the airway  Patient's mastication pattern is abnormal, but functional given some missing dentition. No penetration or aspiration appreciated.     Physiological Findings  Oral    - Labial Closure: intact    - Lingual Function: intact    - Mastication: slow (but managed trials of regular bolus so it was a cohesive re-collection)  Pharyngeal    - Pharyngeal Swallow Response: impaired timing/coordination    - Soft Palate Elevation: intact    - Base of Tongue Retraction: intact    - Epiglottic Inversion: intact    - Hyolaryngeal Elevation: intact    - Anterior Hyoid Excursion: intact    - Posterior Pharyngeal Wall Contraction: intact     - Upper Esophageal Sphincter (UES)  Opening: reduced  Copy of Study Stored In: radiology and PACS  Results Discussed With: medical doctor, radiologist, patient and nurse                 Education:   - Present and ready to learn: patient  Education provided during session:  - aspiration precautions, dysphagia and role of SLP  - Results of above outlined education: Verbalizes understanding and Demonstrates understanding    ASSESSMENT  Interferring components: affect/behavior    Discharge needs based on today's assessment:  - Current level of function: at baseline level of function    Today's videofluoroscopic swallow evaluation revealed patient with mild oropharyngeal dysphagia. Patient's oral phase of swallowing is c/b slow mastication which is likely baseline for patient given some missing dentition. Patient with premature spillage observed to the level of the valleculae. Patient with posterior and anterior spinal brackets s/p cervical spinal repair causing some reduction of pharyngeal abilities. Notably, UES opening was reduced given poor mechanical traction forces in presence of anterior and posterior spinal brackets. Trace pharyngeal residue appreciated on all PO trials. Given pharyngeal residue, patient was cued to complete sequential swallow to reduce pharyngeal residue. This strategy was successful. Trace superficial penetration identified during the swallow during sequential sips of thin liquid with suspected partial-full clearance with swallow. No aspiration appreciated during today's study. Recommend diet continuation of general and thin liquid diets with use of aspiration precautions (x2 swallows with each sip, sit fully upright for all PO intake, slow rate of intake) to minimize risks of aspiration related sequela. Results and recommendations discussed in full with patient, RN, and MD. SLP to follow up x1 to ensure tolerance of recommended diet and adherence to aspiration precautions.     PLAN (while hospitalized)  DT  SLP Frequency: 6-7 x  per week       Interventions:  Assess tolerance of least restrictive oral diet    Plan/Goal Agreement:  Patient agrees with goals and individualized plan of care      RECOMMENDATIONS     -Diet:          *Liquid- Thin and Regular    -Medication Administration:         *patient preference    -Feeding Guidelines:          *eat slowly only when alert, sit fully upright for all po intake and small bites/sips (swallow x2 with all sips)    -Speech Reviewed Swallow:         *with patient/family, with clinical caregivers and feeding guidelines posted in room    GOALS  Review Date: 3/20/2024  Long Term Goals: (to be met by time of discharge from hospital)  Patient will manage least restrictive diet with optimum safety and efficiency of swallow function without aspiration related se[place]a given minimal cues for small/single sips and swallow x2-ONGOING    Documented in the chart in the following areas:    Assessment.    Patient at End of Session:   Location: on cart  Safety measures: alarm system in place/re-engaged, bed rails x2 and call light within reach  Handoff to: nurse    I was in the immediate presence of the student and directed the student’s performance of the services. I am responsible for all treatment, assessment, documentation, and billing rendered for this patient.   Lilly Toney, SLP      Therapy procedure time and total treatment time can be found documented on the Time Entry flowsheet   80

## 2024-12-15 NOTE — DISCHARGE NOTE ADULT - CARE PROVIDER_API CALL
Sanford White), Gastroenterology; Internal Medicine  [address] [place] [address]  Phone: [phone]  Fax: [phone]    PCP,   Follow up in 2 weeks with CBC  Phone: (   )    -  Fax: (   )    - abnormal

## 2025-02-03 NOTE — ED ADULT NURSE NOTE - PRO INTERPRETER NEED 2
02/03/25                            Whit Sexton  [address] [address]    To Whom It May Concern:    This is to certify Whit Sexton was evaluated with Rachael Mcdonald PA-C on 02/03/25 and is excused from school on 02/04/2025.                 Electronically signed by:  Rachael Mcdonald PA-C  [facility]  [address] [address]  Dept Phone: [phone]    English

## 2025-03-18 ENCOUNTER — EMERGENCY (EMERGENCY)
Facility: HOSPITAL | Age: 60
LOS: 1 days | Discharge: HOME | End: 2025-03-18
Attending: STUDENT IN AN ORGANIZED HEALTH CARE EDUCATION/TRAINING PROGRAM
Payer: MEDICAID

## 2025-03-18 VITALS
SYSTOLIC BLOOD PRESSURE: 111 MMHG | DIASTOLIC BLOOD PRESSURE: 65 MMHG | TEMPERATURE: 98 F | RESPIRATION RATE: 18 BRPM | HEART RATE: 76 BPM | OXYGEN SATURATION: 96 %

## 2025-03-18 VITALS
DIASTOLIC BLOOD PRESSURE: 72 MMHG | TEMPERATURE: 98 F | OXYGEN SATURATION: 99 % | RESPIRATION RATE: 18 BRPM | WEIGHT: 160.28 LBS | HEIGHT: 67.72 IN | HEART RATE: 82 BPM | SYSTOLIC BLOOD PRESSURE: 153 MMHG

## 2025-03-18 LAB
ACANTHOCYTES BLD QL SMEAR: SLIGHT — SIGNIFICANT CHANGE UP
ALBUMIN SERPL ELPH-MCNC: 4.3 G/DL — SIGNIFICANT CHANGE UP (ref 3.3–5.2)
ALP SERPL-CCNC: 80 U/L — SIGNIFICANT CHANGE UP (ref 40–120)
ALT FLD-CCNC: 6 U/L — SIGNIFICANT CHANGE UP
ANION GAP SERPL CALC-SCNC: 13 MMOL/L — SIGNIFICANT CHANGE UP (ref 5–17)
ANISOCYTOSIS BLD QL: ABNORMAL
APPEARANCE UR: CLEAR — SIGNIFICANT CHANGE UP
AST SERPL-CCNC: 14 U/L — SIGNIFICANT CHANGE UP
BACTERIA # UR AUTO: NEGATIVE /HPF — SIGNIFICANT CHANGE UP
BASOPHILS # BLD AUTO: 0.03 K/UL — SIGNIFICANT CHANGE UP (ref 0–0.2)
BASOPHILS NFR BLD AUTO: 0.3 % — SIGNIFICANT CHANGE UP (ref 0–2)
BILIRUB SERPL-MCNC: 0.3 MG/DL — LOW (ref 0.4–2)
BILIRUB UR-MCNC: NEGATIVE — SIGNIFICANT CHANGE UP
BUN SERPL-MCNC: 22.9 MG/DL — HIGH (ref 8–20)
BURR CELLS BLD QL SMEAR: SLIGHT — SIGNIFICANT CHANGE UP
CALCIUM SERPL-MCNC: 9 MG/DL — SIGNIFICANT CHANGE UP (ref 8.4–10.5)
CAST: 2 /LPF — SIGNIFICANT CHANGE UP (ref 0–4)
CHLORIDE SERPL-SCNC: 101 MMOL/L — SIGNIFICANT CHANGE UP (ref 96–108)
CO2 SERPL-SCNC: 23 MMOL/L — SIGNIFICANT CHANGE UP (ref 22–29)
COLOR SPEC: YELLOW — SIGNIFICANT CHANGE UP
CREAT SERPL-MCNC: 1.13 MG/DL — SIGNIFICANT CHANGE UP (ref 0.5–1.3)
DACRYOCYTES BLD QL SMEAR: SLIGHT — SIGNIFICANT CHANGE UP
DIFF PNL FLD: ABNORMAL
EGFR: 75 ML/MIN/1.73M2 — SIGNIFICANT CHANGE UP
EGFR: 75 ML/MIN/1.73M2 — SIGNIFICANT CHANGE UP
ELLIPTOCYTES BLD QL SMEAR: SLIGHT — SIGNIFICANT CHANGE UP
EOSINOPHIL # BLD AUTO: 0 K/UL — SIGNIFICANT CHANGE UP (ref 0–0.5)
EOSINOPHIL NFR BLD AUTO: 0 % — SIGNIFICANT CHANGE UP (ref 0–6)
GLUCOSE SERPL-MCNC: 121 MG/DL — HIGH (ref 70–99)
GLUCOSE UR QL: NEGATIVE MG/DL — SIGNIFICANT CHANGE UP
HCT VFR BLD CALC: 33.7 % — LOW (ref 39–50)
HGB BLD-MCNC: 9.7 G/DL — LOW (ref 13–17)
HYPOCHROMIA BLD QL: ABNORMAL
IMM GRANULOCYTES # BLD AUTO: 0.04 K/UL — SIGNIFICANT CHANGE UP (ref 0–0.07)
IMM GRANULOCYTES NFR BLD AUTO: 0.4 % — SIGNIFICANT CHANGE UP (ref 0–0.9)
KETONES UR-MCNC: 15 MG/DL
LEUKOCYTE ESTERASE UR-ACNC: NEGATIVE — SIGNIFICANT CHANGE UP
LIDOCAIN IGE QN: 32 U/L — SIGNIFICANT CHANGE UP (ref 22–51)
LYMPHOCYTES # BLD AUTO: 0.58 K/UL — LOW (ref 1–3.3)
LYMPHOCYTES NFR BLD AUTO: 5.6 % — LOW (ref 13–44)
MANUAL SMEAR VERIFICATION: SIGNIFICANT CHANGE UP
MCHC RBC-ENTMCNC: 18 PG — LOW (ref 27–34)
MCHC RBC-ENTMCNC: 28.8 G/DL — LOW (ref 32–36)
MCV RBC AUTO: 62.4 FL — LOW (ref 80–100)
MICROCYTES BLD QL: ABNORMAL
MONOCYTES # BLD AUTO: 0.5 K/UL — SIGNIFICANT CHANGE UP (ref 0–0.9)
MONOCYTES NFR BLD AUTO: 4.8 % — SIGNIFICANT CHANGE UP (ref 2–14)
NEUTROPHILS # BLD AUTO: 9.29 K/UL — HIGH (ref 1.8–7.4)
NEUTROPHILS NFR BLD AUTO: 88.9 % — HIGH (ref 43–77)
NITRITE UR-MCNC: NEGATIVE — SIGNIFICANT CHANGE UP
NRBC # BLD AUTO: 0 K/UL — SIGNIFICANT CHANGE UP (ref 0–0)
NRBC # FLD: 0 K/UL — SIGNIFICANT CHANGE UP (ref 0–0)
NRBC BLD AUTO-RTO: 0 /100 WBCS — SIGNIFICANT CHANGE UP (ref 0–0)
OVALOCYTES BLD QL SMEAR: ABNORMAL
PH UR: 5.5 — SIGNIFICANT CHANGE UP (ref 5–8)
PLAT MORPH BLD: NORMAL — SIGNIFICANT CHANGE UP
PLATELET # BLD AUTO: 369 K/UL — SIGNIFICANT CHANGE UP (ref 150–400)
PMV BLD: 9.7 FL — SIGNIFICANT CHANGE UP (ref 7–13)
POIKILOCYTOSIS BLD QL AUTO: ABNORMAL
POLYCHROMASIA BLD QL SMEAR: SLIGHT — SIGNIFICANT CHANGE UP
POTASSIUM SERPL-MCNC: 4.5 MMOL/L — SIGNIFICANT CHANGE UP (ref 3.5–5.3)
POTASSIUM SERPL-SCNC: 4.5 MMOL/L — SIGNIFICANT CHANGE UP (ref 3.5–5.3)
PROT SERPL-MCNC: 7.6 G/DL — SIGNIFICANT CHANGE UP (ref 6.6–8.7)
PROT UR-MCNC: 30 MG/DL
RBC # BLD: 5.4 M/UL — SIGNIFICANT CHANGE UP (ref 4.2–5.8)
RBC # FLD: 20.2 % — HIGH (ref 10.3–14.5)
RBC BLD AUTO: ABNORMAL
RBC CASTS # UR COMP ASSIST: 5 /HPF — HIGH (ref 0–4)
SCHISTOCYTES BLD QL AUTO: ABNORMAL
SODIUM SERPL-SCNC: 137 MMOL/L — SIGNIFICANT CHANGE UP (ref 135–145)
SP GR SPEC: >1.03 — HIGH (ref 1–1.03)
SQUAMOUS # UR AUTO: 1 /HPF — SIGNIFICANT CHANGE UP (ref 0–5)
UROBILINOGEN FLD QL: 1 MG/DL — SIGNIFICANT CHANGE UP (ref 0.2–1)
WBC # BLD: 10.44 K/UL — SIGNIFICANT CHANGE UP (ref 3.8–10.5)
WBC # FLD AUTO: 10.44 K/UL — SIGNIFICANT CHANGE UP (ref 3.8–10.5)
WBC UR QL: 1 /HPF — SIGNIFICANT CHANGE UP (ref 0–5)

## 2025-03-18 RX ORDER — ONDANSETRON HCL/PF 4 MG/2 ML
4 VIAL (ML) INJECTION ONCE
Refills: 0 | Status: COMPLETED | OUTPATIENT
Start: 2025-03-18 | End: 2025-03-18

## 2025-03-18 RX ORDER — KETOROLAC TROMETHAMINE 30 MG/ML
30 INJECTION, SOLUTION INTRAMUSCULAR; INTRAVENOUS ONCE
Refills: 0 | Status: DISCONTINUED | OUTPATIENT
Start: 2025-03-18 | End: 2025-03-18

## 2025-03-18 RX ORDER — TAMSULOSIN HYDROCHLORIDE 0.4 MG/1
1 CAPSULE ORAL
Qty: 14 | Refills: 0
Start: 2025-03-18 | End: 2025-03-31

## 2025-03-18 RX ORDER — OXYCODONE HYDROCHLORIDE AND ACETAMINOPHEN 10; 325 MG/1; MG/1
1 TABLET ORAL
Qty: 12 | Refills: 0
Start: 2025-03-18 | End: 2025-03-20

## 2025-03-18 RX ORDER — TAMSULOSIN HYDROCHLORIDE 0.4 MG/1
0.4 CAPSULE ORAL ONCE
Refills: 0 | Status: COMPLETED | OUTPATIENT
Start: 2025-03-18 | End: 2025-03-18

## 2025-03-18 RX ORDER — IBUPROFEN 200 MG
1 TABLET ORAL
Qty: 28 | Refills: 0
Start: 2025-03-18 | End: 2025-03-24

## 2025-03-18 RX ADMIN — Medication 4 MILLIGRAM(S): at 09:36

## 2025-03-18 RX ADMIN — Medication 4 MILLIGRAM(S): at 09:37

## 2025-03-18 RX ADMIN — Medication 4 MILLIGRAM(S): at 11:45

## 2025-03-18 RX ADMIN — Medication 1000 MILLILITER(S): at 09:35

## 2025-03-18 NOTE — ED PROVIDER NOTE - ATTENDING APP SHARED VISIT CONTRIBUTION OF CARE
I, Kwan Gallardo MD, have seen and examined the patient on the date of service.  I agree with the VU's assessment as written, with exceptions or additions as noted below or in a separate note.  Utilizing ED  Nils, patient with no significant past medical history is presenting with lower abdominal pain, nausea and vomiting for the past 3 days.  States the pain has been progressively getting worse.  No associated fevers.  No history of similar episodes.  Has been having intermittent dysuria.  On physical exam here patient has suprapubic and right lower quadrant tenderness palpation.  He is otherwise afebrile and not tachycardic.  Concern for appendicitis versus less likely renal stone or diverticulitis based on location of pain.  Will plan on lab work, imaging, medications and reassessment.

## 2025-03-18 NOTE — ED PROVIDER NOTE - CARE PROVIDER_API CALL
Aiden De Leon  Urology  [address], [address]  [place] [address]  Phone: [phone]  Fax: [phone]  Follow Up Time:

## 2025-03-18 NOTE — ED PROVIDER NOTE - CLINICAL SUMMARY MEDICAL DECISION MAKING FREE TEXT BOX
60-year-old male no PMHx presents ED complaining of RLQ abdominal pain x 3 days with nausea, vomiting.  Patient reports pain has been persistent for the past 3 days and has had nausea and decreased appetite.  Exam with +RLQ tenderness. Further evaluate for appendicitis with labs, CTAP 60-year-old male no PMHx presents ED complaining of RLQ abdominal pain x 3 days with nausea, vomiting.  Patient reports pain has been persistent for the past 3 days and has had nausea and decreased appetite.  Exam with +RLQ tenderness. Further evaluate for appendicitis with labs, CTAP  Labs reviewed, notable for mild anemia, Hgb 9.7,  Slight abnormalities on Red cell morphology similar to prior labs.  UA negative for infection.  CT demonstrating 4 mm right distal ureter stone.  Patient feeling better after medications, tolerating p.o. intake.  All results reviewed patient and family at bedside via ED .  Patient had 1 prior kidney stone but did not follow with urology.  Given dose of Flomax in ED.  Provided copy of results and follow-up information for urology. Return precautions discussed. Pt verbalized understanding of all results, treatment plan, follow-up instructions and reasons to return to ED.

## 2025-03-18 NOTE — ED PROVIDER NOTE - NS_EDPROVIDERDISPOUSERTYPE_ED_A_ED
Other (Free Text): Ok to refill ketoconazole as needed Render Risk Assessment In Note?: no Note Text (......Xxx Chief Complaint.): This diagnosis correlates with the Detail Level: Detailed Attending Attestation (For Attendings USE Only)...

## 2025-03-18 NOTE — ED PROVIDER NOTE - INTERNATIONAL TRAVEL
[FreeTextEntry1] : Imp:\par History of b/l mastectomy for b/l breast cancer\par No evidence of new or recurrent lesions. No suspicious lesions on exam\par \par Plan:\par Continue yearly surveillance\par \par \par 
No

## 2025-03-18 NOTE — ED PROVIDER NOTE - PHYSICAL EXAMINATION
Gen: Uncomfortable appearing   HENT: NCAT, Mucous membranes moist  Eyes: pink conjunctivae, EOMI, PERRL  CV: RRR, nl s1/s2.  Resp: CTAB, normal rate and effort  GI: Abdomen soft, +TTP RLQ. No rebound, no guarding  : No CVAT  Neuro: A&O x 3, sensorimotor intact without deficits   MSK: Full ROM ext x 4  Skin: No rashes. intact and perfused.

## 2025-03-18 NOTE — ED PROVIDER NOTE - PATIENT PORTAL LINK FT
You can access the FollowMyHealth Patient Portal offered by [facility] by registering at the following website: [email or web address]. By joining [employer]’s FollowMyHealth portal, you will also be able to view your health information using other applications (apps) compatible with our system.

## 2025-03-18 NOTE — ED PROVIDER NOTE - NSFOLLOWUPINSTRUCTIONS_ED_ALL_ED_FT
- Return to the ED for any new or worsening symptoms.   - Follow up with your doctor within 2-3 days.   - Follow-up with urologist provided for further evaluation of kidney stone  - Follow-up with primary doctor for further evaluation of anemia    Kidney Stones    Kidney stones (urolithiasis) are crystal deposits that form inside your kidneys. Pain is caused by the stone moving through the urinary tract, causing spasms of the ureter. Drink enough water and fluids to keep your urine clear or pale yellow. This will help you to pass the stone or stone fragments. If provided a strainer, strain all urine and keep all particulate matter and stones for a follow up appointment with a urologist.    SEEK IMMEDIATE MEDICAL CARE IF YOU HAVE ANY OF THE FOLLOWING SYMPTOMS: pain not controlled with medication, fever/chills, worsening vomiting, inability to urinate, or dizziness/lightheadedness.     Anemia    Anemia is a condition in which the concentration of red blood cells or hemoglobin in the blood is below normal. Hemoglobin is a substance in red blood cells that carries oxygen to the tissues of the body. Anemia results in not enough oxygen reaching these tissues which can cause symptoms such as weakness, dizziness/lightheadedness, shortness of breath, chest pain, paleness, or nausea. The cause of your anemia may or may not be determined immediately. If your hemoglobin was dangerously low, you may have received a blood transfusion. Usually reactions to transfusions occur immediately but monitor yourself for any fevers, rash, or shortness of breath.    SEEK IMMEDIATE MEDICAL CARE IF YOU HAVE ANY OF THE FOLLOWING SYMPTOMS: extreme weakness/chest pain/shortness of breath, black or bloody stools, vomiting blood, fainting, fever, or any signs of dehydration.     - Regrese a urgencias si presenta síntomas nuevos o empeora.  - Acuda a angelito marbella de seguimiento con hill médico en un plazo de 2 a 3 días.  - Se le proporcionará angelito amrbella de seguimiento con el urólogo para angelito evaluación más detallada de los cálculos renales.  - Acuda a angelito marbella de seguimiento con hill médico de cabecera para angelito evaluación más detallada de la anemia.    Cálculos renales    Los cálculos renales (urolitiasis) son depósitos de abundio que se vianey en el interior de los riñones. El dolor se debe al desplazamiento del cálculo por las vías urinarias, lo que provoca espasmos en el uréter. Vickie suficiente agua y líquidos para mantener la orina augusta o de color amarillo pálido. Clyman le ayudará a expulsar el cálculo o nikita fragmentos. Si le proporcionan un colador, cuele toda la orina y guarde todas las partículas y los cálculos para angelito marbella de seguimiento con el urólogo.    BUSQUE ATENCIÓN MÉDICA INMEDIATA SI PRESENTA ALGUNO DE LOS SIGUIENTES SÍNTOMAS: dolor que no se controla con medicamentos, fiebre/escalofríos, empeoramiento de los vómitos, dificultad para orinar o mareos/aturdimiento.    Anemia    La anemia es angelito afección en la que la concentración de glóbulos rojos o hemoglobina en la paul es inferior a la normal. La hemoglobina es angelito sustancia presente en los glóbulos rojos que transporta oxígeno a los tejidos del cuerpo. La anemia provoca que no llegue suficiente oxígeno a estos tejidos, lo que puede causar síntomas cachorro debilidad, mareos, dificultad para respirar, dolor en el pecho, palidez o náuseas. La causa de la anemia puede o no determinarse de inmediato. Si hill hemoglobina estaba peligrosamente baja, es posible que haya recibido angelito transfusión de paul. Por lo general, las reacciones a las transfusiones ocurren de inmediato, georgie esté atento a cualquier fiebre, sarpullido o dificultad para respirar.    BUSQUE ATENCIÓN MÉDICA INMEDIATA SI PRESENTA ALGUNO DE LOS SIGUIENTES SÍNTOMAS: debilidad extrema, dolor en el pecho, dificultad para respirar, heces negras o con paul, vómitos con paul, desmayos, fiebre o cualquier signo de deshidratación.

## 2025-03-18 NOTE — ED PROVIDER NOTE - OBJECTIVE STATEMENT
60-year-old male no PMHx presents ED complaining of RLQ abdominal pain, nausea, vomiting x 3 days.   Patient reports pain has been persistent for the past 3 days associated with nausea and decreased appetite.  Not take any medication for pain.  Also reporting intermittent dysuria over the past couple days.  States he only has dysuria sometimes, not every time he urinates.  Denies fever, chills, CP, SOB, hematuria, diarrhea, constipation, recent travel, sick contacts.  No prior abdominal surgeries.  : Sarahy

## 2025-03-19 LAB
CULTURE RESULTS: NO GROWTH — SIGNIFICANT CHANGE UP
SPECIMEN SOURCE: SIGNIFICANT CHANGE UP

## 2025-03-22 ENCOUNTER — EMERGENCY (EMERGENCY)
Facility: HOSPITAL | Age: 60
LOS: 1 days | Discharge: HOME | End: 2025-03-22
Attending: STUDENT IN AN ORGANIZED HEALTH CARE EDUCATION/TRAINING PROGRAM
Payer: MEDICAID

## 2025-03-22 ENCOUNTER — INPATIENT (INPATIENT)
Facility: HOSPITAL | Age: 60
LOS: 2 days | Discharge: HOME | DRG: 694 | End: 2025-03-25
Attending: FAMILY MEDICINE | Admitting: STUDENT IN AN ORGANIZED HEALTH CARE EDUCATION/TRAINING PROGRAM
Payer: MEDICAID

## 2025-03-22 VITALS
RESPIRATION RATE: 20 BRPM | DIASTOLIC BLOOD PRESSURE: 76 MMHG | TEMPERATURE: 98 F | HEART RATE: 83 BPM | HEIGHT: 67.72 IN | SYSTOLIC BLOOD PRESSURE: 136 MMHG | OXYGEN SATURATION: 98 % | WEIGHT: 171.96 LBS

## 2025-03-22 VITALS
RESPIRATION RATE: 20 BRPM | HEART RATE: 69 BPM | SYSTOLIC BLOOD PRESSURE: 122 MMHG | DIASTOLIC BLOOD PRESSURE: 78 MMHG | OXYGEN SATURATION: 99 %

## 2025-03-22 VITALS
OXYGEN SATURATION: 99 % | RESPIRATION RATE: 18 BRPM | HEART RATE: 80 BPM | TEMPERATURE: 98 F | DIASTOLIC BLOOD PRESSURE: 77 MMHG | SYSTOLIC BLOOD PRESSURE: 134 MMHG

## 2025-03-22 LAB
ACANTHOCYTES BLD QL SMEAR: SLIGHT — SIGNIFICANT CHANGE UP
ALBUMIN SERPL ELPH-MCNC: 4 G/DL — SIGNIFICANT CHANGE UP (ref 3.3–5.2)
ALP SERPL-CCNC: 71 U/L — SIGNIFICANT CHANGE UP (ref 40–120)
ALT FLD-CCNC: 7 U/L — SIGNIFICANT CHANGE UP
ANION GAP SERPL CALC-SCNC: 9 MMOL/L — SIGNIFICANT CHANGE UP (ref 5–17)
ANISOCYTOSIS BLD QL: ABNORMAL
APPEARANCE UR: CLEAR — SIGNIFICANT CHANGE UP
AST SERPL-CCNC: 23 U/L — SIGNIFICANT CHANGE UP
BASOPHILS # BLD AUTO: 0.02 K/UL — SIGNIFICANT CHANGE UP (ref 0–0.2)
BASOPHILS # BLD MANUAL: 0.05 K/UL — SIGNIFICANT CHANGE UP (ref 0–0.2)
BASOPHILS NFR BLD AUTO: 0.4 % — SIGNIFICANT CHANGE UP (ref 0–2)
BASOPHILS NFR BLD MANUAL: 0.9 % — SIGNIFICANT CHANGE UP (ref 0–2)
BILIRUB SERPL-MCNC: 0.3 MG/DL — LOW (ref 0.4–2)
BILIRUB UR-MCNC: NEGATIVE — SIGNIFICANT CHANGE UP
BUN SERPL-MCNC: 11.5 MG/DL — SIGNIFICANT CHANGE UP (ref 8–20)
CALCIUM SERPL-MCNC: 8.4 MG/DL — SIGNIFICANT CHANGE UP (ref 8.4–10.5)
CHLORIDE SERPL-SCNC: 101 MMOL/L — SIGNIFICANT CHANGE UP (ref 96–108)
CO2 SERPL-SCNC: 26 MMOL/L — SIGNIFICANT CHANGE UP (ref 22–29)
COLOR SPEC: YELLOW — SIGNIFICANT CHANGE UP
CREAT SERPL-MCNC: 0.69 MG/DL — SIGNIFICANT CHANGE UP (ref 0.5–1.3)
DACRYOCYTES BLD QL SMEAR: SLIGHT — SIGNIFICANT CHANGE UP
DIFF PNL FLD: NEGATIVE — SIGNIFICANT CHANGE UP
EGFR: 107 ML/MIN/1.73M2 — SIGNIFICANT CHANGE UP
EGFR: 107 ML/MIN/1.73M2 — SIGNIFICANT CHANGE UP
ELLIPTOCYTES BLD QL SMEAR: ABNORMAL
EOSINOPHIL # BLD AUTO: 0.07 K/UL — SIGNIFICANT CHANGE UP (ref 0–0.5)
EOSINOPHIL # BLD MANUAL: 0 K/UL — SIGNIFICANT CHANGE UP (ref 0–0.5)
EOSINOPHIL NFR BLD AUTO: 1.3 % — SIGNIFICANT CHANGE UP (ref 0–6)
EOSINOPHIL NFR BLD MANUAL: 0 % — SIGNIFICANT CHANGE UP (ref 0–6)
GIANT PLATELETS BLD QL SMEAR: PRESENT
GLUCOSE SERPL-MCNC: 107 MG/DL — HIGH (ref 70–99)
GLUCOSE UR QL: NEGATIVE MG/DL — SIGNIFICANT CHANGE UP
HCT VFR BLD CALC: 31.5 % — LOW (ref 39–50)
HGB BLD-MCNC: 9.1 G/DL — LOW (ref 13–17)
HYPOCHROMIA BLD QL: SLIGHT — SIGNIFICANT CHANGE UP
IMM GRANULOCYTES # BLD AUTO: 0.01 K/UL — SIGNIFICANT CHANGE UP (ref 0–0.07)
IMM GRANULOCYTES NFR BLD AUTO: 0.2 % — SIGNIFICANT CHANGE UP (ref 0–0.9)
KETONES UR-MCNC: 15 MG/DL
LEUKOCYTE ESTERASE UR-ACNC: NEGATIVE — SIGNIFICANT CHANGE UP
LYMPHOCYTES # BLD AUTO: 1.09 K/UL — SIGNIFICANT CHANGE UP (ref 1–3.3)
LYMPHOCYTES # BLD MANUAL: 0.87 K/UL — LOW (ref 1–3.3)
LYMPHOCYTES NFR BLD AUTO: 19.5 % — SIGNIFICANT CHANGE UP (ref 13–44)
LYMPHOCYTES NFR BLD MANUAL: 15.5 % — SIGNIFICANT CHANGE UP (ref 13–44)
MANUAL REACTIVE LYMPHOCYTES #: 0.24 K/UL — SIGNIFICANT CHANGE UP (ref 0–0.63)
MCHC RBC-ENTMCNC: 18.1 PG — LOW (ref 27–34)
MCHC RBC-ENTMCNC: 28.9 G/DL — LOW (ref 32–36)
MCV RBC AUTO: 62.6 FL — LOW (ref 80–100)
MICROCYTES BLD QL: ABNORMAL
MONOCYTES # BLD AUTO: 0.64 K/UL — SIGNIFICANT CHANGE UP (ref 0–0.9)
MONOCYTES # BLD MANUAL: 0.29 K/UL — SIGNIFICANT CHANGE UP (ref 0–0.9)
MONOCYTES NFR BLD AUTO: 11.4 % — SIGNIFICANT CHANGE UP (ref 2–14)
MONOCYTES NFR BLD MANUAL: 5.2 % — SIGNIFICANT CHANGE UP (ref 2–14)
NEUTROPHILS # BLD AUTO: 3.76 K/UL — SIGNIFICANT CHANGE UP (ref 1.8–7.4)
NEUTROPHILS # BLD MANUAL: 4.14 K/UL — SIGNIFICANT CHANGE UP (ref 1.8–7.4)
NEUTROPHILS NFR BLD AUTO: 67.2 % — SIGNIFICANT CHANGE UP (ref 43–77)
NEUTROPHILS NFR BLD MANUAL: 74.1 % — SIGNIFICANT CHANGE UP (ref 43–77)
NITRITE UR-MCNC: NEGATIVE — SIGNIFICANT CHANGE UP
NRBC # BLD AUTO: 0 K/UL — SIGNIFICANT CHANGE UP (ref 0–0)
NRBC # FLD: 0 K/UL — SIGNIFICANT CHANGE UP (ref 0–0)
NRBC BLD AUTO-RTO: 0 /100 WBCS — SIGNIFICANT CHANGE UP (ref 0–0)
OVALOCYTES BLD QL SMEAR: ABNORMAL
PH UR: 6.5 — SIGNIFICANT CHANGE UP (ref 5–8)
PLAT MORPH BLD: NORMAL — SIGNIFICANT CHANGE UP
PLATELET # BLD AUTO: 341 K/UL — SIGNIFICANT CHANGE UP (ref 150–400)
PMV BLD: 10.4 FL — SIGNIFICANT CHANGE UP (ref 7–13)
POIKILOCYTOSIS BLD QL AUTO: ABNORMAL
POTASSIUM SERPL-MCNC: 4.2 MMOL/L — SIGNIFICANT CHANGE UP (ref 3.5–5.3)
POTASSIUM SERPL-SCNC: 4.2 MMOL/L — SIGNIFICANT CHANGE UP (ref 3.5–5.3)
PROT SERPL-MCNC: 7.1 G/DL — SIGNIFICANT CHANGE UP (ref 6.6–8.7)
PROT UR-MCNC: SIGNIFICANT CHANGE UP MG/DL
RBC # BLD: 5.03 M/UL — SIGNIFICANT CHANGE UP (ref 4.2–5.8)
RBC # FLD: 19.3 % — HIGH (ref 10.3–14.5)
RBC BLD AUTO: ABNORMAL
SODIUM SERPL-SCNC: 136 MMOL/L — SIGNIFICANT CHANGE UP (ref 135–145)
SP GR SPEC: 1.02 — SIGNIFICANT CHANGE UP (ref 1–1.03)
UROBILINOGEN FLD QL: 1 MG/DL — SIGNIFICANT CHANGE UP (ref 0.2–1)
VARIANT LYMPHS # BLD: 4.3 % — SIGNIFICANT CHANGE UP (ref 0–6)
VARIANT LYMPHS NFR BLD MANUAL: 4.3 % — SIGNIFICANT CHANGE UP (ref 0–6)
WBC # BLD: 5.59 K/UL — SIGNIFICANT CHANGE UP (ref 3.8–10.5)
WBC # FLD AUTO: 5.59 K/UL — SIGNIFICANT CHANGE UP (ref 3.8–10.5)

## 2025-03-22 PROCEDURE — 99285 EMERGENCY DEPT VISIT HI MDM: CPT

## 2025-03-22 PROCEDURE — 93010 ELECTROCARDIOGRAM REPORT: CPT

## 2025-03-22 RX ORDER — KETOROLAC TROMETHAMINE 30 MG/ML
30 INJECTION, SOLUTION INTRAMUSCULAR; INTRAVENOUS ONCE
Refills: 0 | Status: DISCONTINUED | OUTPATIENT
Start: 2025-03-22 | End: 2025-03-22

## 2025-03-22 RX ORDER — KETOROLAC TROMETHAMINE 30 MG/ML
15 INJECTION, SOLUTION INTRAMUSCULAR; INTRAVENOUS ONCE
Refills: 0 | Status: DISCONTINUED | OUTPATIENT
Start: 2025-03-22 | End: 2025-03-22

## 2025-03-22 RX ORDER — ONDANSETRON HCL/PF 4 MG/2 ML
4 VIAL (ML) INJECTION ONCE
Refills: 0 | Status: COMPLETED | OUTPATIENT
Start: 2025-03-22 | End: 2025-03-23

## 2025-03-22 RX ADMIN — Medication 4 MILLIGRAM(S): at 09:25

## 2025-03-22 RX ADMIN — KETOROLAC TROMETHAMINE 30 MILLIGRAM(S): 30 INJECTION, SOLUTION INTRAMUSCULAR; INTRAVENOUS at 08:41

## 2025-03-22 RX ADMIN — Medication 1000 MILLILITER(S): at 08:40

## 2025-03-22 NOTE — ED PROVIDER NOTE - OBJECTIVE STATEMENT
60-year-old male no PMHx presents to ED complaining of persistent RLQ abdominal pain x 4 days.  Patient was seen in ED 3/18 and diagnosed with a 4mm right distal ureteral stone.  Reports pain has been persistent since being discharged.  Pain improved briefly after taking medication without returns. Also had nausea with episode of vomiting yesterday. Had not made appointment with urology thus far.  Denies fever, chills, dysuria, hematuria, back pain.  : Nicci Elizabeth

## 2025-03-22 NOTE — ED PROVIDER NOTE - PHYSICAL EXAMINATION
Gen: No acute distress  Head: NCAT  Eyes: pink conjunctivae, EOMI, PERRL  CV: RRR, nl s1/s2.  Resp: CTAB, normal rate and effort  GI: Abdomen soft, TTP RLQ. No rebound, no guarding  : No CVAT  Neuro: A&O x 3, sensorimotor intact without deficits   MSK: Full ROM ext x 4  Skin: No rashes. intact and perfused.

## 2025-03-22 NOTE — ED PROVIDER NOTE - ATTENDING APP SHARED VISIT CONTRIBUTION OF CARE
Rosy: I performed a face to face bedside interview with patient regarding history of present illness, review of symptoms and past medical history. I completed an independent physical exam.  I have discussed patient's plan of care with advanced care provider.   I agree with note as stated above including HISTORY OF PRESENT ILLNESS, HIV, PAST MEDICAL/SURGICAL/FAMILY/SOCIAL HISTORY, ALLERGIES AND HOME MEDICATIONS, REVIEW OF SYSTEMS, PHYSICAL EXAM, MEDICAL DECISION MAKING and any PROGRESS NOTES during the time I functioned as the attending physician for this patient  unless otherwise noted. My brief assessment is as follows: 59 y/o male hx recent kidney stone on ct, then had sono for pain with no hydro and clean urine per pt, p/w rlq pain. +vomiting. no f/c. no urinary symptoms. no other complaints. non toxic, nad, ctab, rrr, abd soft with rlq ttp near m[place]ur[place] point. neuro intact. labs. ct, pain control, reassess

## 2025-03-22 NOTE — ED ADULT TRIAGE NOTE - CHIEF COMPLAINT QUOTE
PT BIBA stated that he was recently d/c from [facility] for kidney stones.  PT stated that he is still having R flank pain, took ibuprofen earlier.

## 2025-03-22 NOTE — ED ADULT NURSE NOTE - OBJECTIVE STATEMENT
Assumed care of pt at 0847. Pt A&Ox4 c/o N/V/right sided flank pain, pt was here 3 days ago for right sided kidney stone, denies CP/SOB, pt resting comfortably showing no signs of respiratory distress or pain, pt is calm and cooperative

## 2025-03-22 NOTE — ED PROVIDER NOTE - CLINICAL SUMMARY MEDICAL DECISION MAKING FREE TEXT BOX
60 year old male recent dx of right renal stone 03/19 @ [facility], returned for similar complaint 03/22 in AM present to ED for right flank and abdominal pain. Pt reports sustained pain with assocated NBNB emesis. Pt denies fever, chills, sweat, SOB, CP, diarrhea, dysuria, hematuria.   CT, labs, meds ,reassess 60 year old male recent dx of right renal stone 03/19 @ [facility], returned for similar complaint 03/22 in AM present to ED for right flank and abdominal pain. Pt reports sustained pain with assocated NBNB emesis. Pt denies fever, chills, sweat, SOB, CP, diarrhea, dysuria, hematuria.   CT, labs, meds ,reassess  CT "Mild right-sided hydroureter in comparison to the left secondary to a 5 mm calculus in the distal right ureter" "Thickening of the walls of the gastric antrum and duodenal bulb with focal ulceration/outpouching along the lesser curvature."   Urology consulted, recommending medical admission and GI consult prior to stent placement.     pt to be admitted to hospital service

## 2025-03-22 NOTE — ED PROVIDER NOTE - NSFOLLOWUPINSTRUCTIONS_ED_ALL_ED_FT
- Return to the ED for any new or worsening symptoms.   - Follow up with your doctor within 2-3 days.   - Continue medications  - Follow-up with urologist for further evaluation of kidney stone    Kidney Stones    Kidney stones (urolithiasis) are crystal deposits that form inside your kidneys. Pain is caused by the stone moving through the urinary tract, causing spasms of the ureter. Drink enough water and fluids to keep your urine clear or pale yellow. This will help you to pass the stone or stone fragments. If provided a strainer, strain all urine and keep all particulate matter and stones for a follow up appointment with a urologist.    SEEK IMMEDIATE MEDICAL CARE IF YOU HAVE ANY OF THE FOLLOWING SYMPTOMS: pain not controlled with medication, fever/chills, worsening vomiting, inability to urinate, or dizziness/lightheadedness.     - Regrese a urgencias si presenta síntomas nuevos o que empeoran.  - Acuda a angelito marbella de seguimiento con hill médico en un plazo de 2 a 3 días.  - Continúe con la medicación.  - Acuda a angelito marbella de seguimiento con un urólogo para angelito evaluación adicional de cálculos renales.    Cálculos renales    Los cálculos renales (urolitiasis) son depósitos de abundio que se vianey en el interior de los riñones. El dolor se debe al desplazamiento del cálculo por las vías urinarias, lo que provoca espasmos en el uréter. Vickie suficiente agua y líquidos para mantener la orina augusta o de color amarillo pálido. Poteet le ayudará a expulsar el cálculo o nikita fragmentos. Si le proporcionan un colador, cuele toda la orina y guarde todas las partículas y los cálculos para angelito marbella de seguimiento con un urólogo.    BUSQUE ATENCIÓN MÉDICA INMEDIATA SI PRESENTA ALGUNO DE LOS SIGUIENTES SÍNTOMAS: dolor que no se controla con medicamentos, fiebre/escalofríos, empeoramiento de los vómitos, dificultad para orinar o mareos/aturdimiento.

## 2025-03-22 NOTE — ED PROVIDER NOTE - PHYSICAL EXAMINATION
Gen: No acute distress, non toxic  HEENT: Mucous membranes moist, pink conjunctivae, EOMI  CV: RRR, nl s1/s2.  Resp: CTAB, normal rate and effort  GI: Abdomen soft, RLQ TTP, ND. No rebound, no guarding  : No CVAT  Neuro: A&O x 3, moving all 4 extremities  MSK: No obvious deformities  Skin: No rashes. intact and perfused.

## 2025-03-22 NOTE — ED PROVIDER NOTE - ATTENDING APP SHARED VISIT CONTRIBUTION OF CARE
Patrick ATTG   60M cc rlq pain found to have kidney stone in the distal ureteral few days ago, pt notes that the pain is persistent which is why he came ot ED today.     GENERAL: Awake, alert, NAD  HEENT: NC/AT, moist mucous membranes,   LUNGS:  non labored breathing   CARDIAC: RRR, no m/r/g  ABDOMEN non tender   BACK: No midline spinal tenderness, no CVA tenderness  EXT: No edema, no calf tenderness no deformities.  NEURO: A&Ox3. Moving all extremities.  SKIN: Warm and dry. No rash.  PSYCH: Normal affect.      given hx and pe plan for labs pain control

## 2025-03-22 NOTE — ED ADULT NURSE NOTE - NSFALLUNIVINTERV_ED_ALL_ED
Bed/Stretcher in lowest position, wheels locked, appropriate side rails in place/Call bell, personal items and telephone in reach/Instruct patient to call for assistance before getting out of bed/chair/stretcher/Non-slip footwear applied when patient is off stretcher/Yolyn to call system/Physically safe environment - no spills, clutter or unnecessary equipment/Purposeful proactive rounding/Room/bathroom lighting operational, light cord in reach

## 2025-03-22 NOTE — ED PROVIDER NOTE - CLINICAL SUMMARY MEDICAL DECISION MAKING FREE TEXT BOX
60-year-old male no PMHx presents to ED complaining of persistent RLQ abdominal pain x 4 days.  Patient was seen in ED 3/18 and diagnosed with a 4mm right distal ureteral stone.  Reports pain has been persistent since being discharged.  Pain improved briefly after taking medication without returns. Also had nausea with episode of vomiting yesterday. No fever or chills. afebrile in ED. Further evaluate with labs, renal ultrasound 60-year-old male no PMHx presents to ED complaining of persistent RLQ abdominal pain x 4 days.  Patient was seen in ED 3/18 and diagnosed with a 4mm right distal ureteral stone.  Reports pain has been persistent since being discharged.  Pain improved briefly after taking medication without returns. Also had nausea with episode of vomiting yesterday. No fever or chills. afebrile in ED. Further evaluate with labs, renal ultrasound  Labs grossly unchanged from prior visit. No leukocytosis. Pain resolved after medications. Ultrasound negative, no hydro. 60-year-old male no PMHx presents to ED complaining of persistent RLQ abdominal pain x 4 days.  Patient was seen in ED 3/18 and diagnosed with a 4mm right distal ureteral stone.  Reports pain has been persistent since being discharged.  Pain improved briefly after taking medication without returns. Also had nausea with episode of vomiting yesterday. No fever or chills. afebrile in ED. Further evaluate with labs, renal ultrasound  Labs grossly unchanged from prior visit. No leukocytosis. Pain resolved after medications. UA negative. Ultrasound negative, no hydro. instructed to f/u with urology. Pt provided copy of all results. Return precautions discussed. Pt verbalized understanding of all results, treatment plan, follow-up instructions and reasons to return to ED.

## 2025-03-22 NOTE — ED PROVIDER NOTE - OBJECTIVE STATEMENT
60 year old male recent dx of right renal stone 03/19 @ [facility], returned for similar complaint 03/22 in AM present to ED for right flank and abdominal pain. Pt reports sustained pain with assocated NBNB emesis. Pt denies fever, chills, sweat, SOB, CP, diarrhea, dysuria, hematuria.

## 2025-03-22 NOTE — ED PROVIDER NOTE - PATIENT PORTAL LINK FT
You can access the FollowMyHealth Patient Portal offered by [facility] by registering at the following website: [email or web address]. By joining [facility]’s FollowMyHealth portal, you will also be able to view your health information using other applications (apps) compatible with our system.

## 2025-03-22 NOTE — ED PROVIDER NOTE - CARE PROVIDER_API CALL
Aiden De Leon  Urology  [address], [address]  [place] [address]  Phone: [phone]  Fax: [phone]  Follow Up Time:

## 2025-03-22 NOTE — ED ADULT TRIAGE NOTE - PAIN: PRESENCE, MLM
Bautista Oglesby :2017 MRN: [id number] #[phone] hit his mouth where his 3 fractured teeth are- hit his mouth this past Thursday, now he is in pain everyday-  called in antibiotic over the phone- started taking as of yesterday- (7 day antibiotic) -not sleeping and hardly eating....a tooth is exposed, it's black and blood around it...child did have a fever this past Friday....mom wants a sooner OR appt.     Left Voicemail.    Resident: Bren Angulo   complains of pain/discomfort

## 2025-03-23 DIAGNOSIS — N20.0 CALCULUS OF KIDNEY: ICD-10-CM

## 2025-03-23 LAB
ACANTHOCYTES BLD QL SMEAR: SLIGHT — SIGNIFICANT CHANGE UP
ALBUMIN SERPL ELPH-MCNC: 3.5 G/DL — SIGNIFICANT CHANGE UP (ref 3.3–5.2)
ALP SERPL-CCNC: 65 U/L — SIGNIFICANT CHANGE UP (ref 40–120)
ALT FLD-CCNC: <5 U/L — SIGNIFICANT CHANGE UP
ANION GAP SERPL CALC-SCNC: 12 MMOL/L — SIGNIFICANT CHANGE UP (ref 5–17)
ANION GAP SERPL CALC-SCNC: 8 MMOL/L — SIGNIFICANT CHANGE UP (ref 5–17)
ANISOCYTOSIS BLD QL: ABNORMAL
APPEARANCE UR: CLEAR — SIGNIFICANT CHANGE UP
APTT BLD: 33 SEC — SIGNIFICANT CHANGE UP (ref 24.5–35.6)
AST SERPL-CCNC: 25 U/L — SIGNIFICANT CHANGE UP
BASOPHILS # BLD AUTO: 0.03 K/UL — SIGNIFICANT CHANGE UP (ref 0–0.2)
BASOPHILS # BLD AUTO: 0.03 K/UL — SIGNIFICANT CHANGE UP (ref 0–0.2)
BASOPHILS NFR BLD AUTO: 0.4 % — SIGNIFICANT CHANGE UP (ref 0–2)
BASOPHILS NFR BLD AUTO: 0.5 % — SIGNIFICANT CHANGE UP (ref 0–2)
BILIRUB SERPL-MCNC: 0.2 MG/DL — LOW (ref 0.4–2)
BILIRUB UR-MCNC: NEGATIVE — SIGNIFICANT CHANGE UP
BLD GP AB SCN SERPL QL: SIGNIFICANT CHANGE UP
BUN SERPL-MCNC: 11.6 MG/DL — SIGNIFICANT CHANGE UP (ref 8–20)
BUN SERPL-MCNC: 12.4 MG/DL — SIGNIFICANT CHANGE UP (ref 8–20)
CALCIUM SERPL-MCNC: 8.3 MG/DL — LOW (ref 8.4–10.5)
CALCIUM SERPL-MCNC: 8.4 MG/DL — SIGNIFICANT CHANGE UP (ref 8.4–10.5)
CHLORIDE SERPL-SCNC: 102 MMOL/L — SIGNIFICANT CHANGE UP (ref 96–108)
CHLORIDE SERPL-SCNC: 102 MMOL/L — SIGNIFICANT CHANGE UP (ref 96–108)
CO2 SERPL-SCNC: 23 MMOL/L — SIGNIFICANT CHANGE UP (ref 22–29)
CO2 SERPL-SCNC: 27 MMOL/L — SIGNIFICANT CHANGE UP (ref 22–29)
COLOR SPEC: YELLOW — SIGNIFICANT CHANGE UP
CREAT SERPL-MCNC: 0.67 MG/DL — SIGNIFICANT CHANGE UP (ref 0.5–1.3)
CREAT SERPL-MCNC: 0.68 MG/DL — SIGNIFICANT CHANGE UP (ref 0.5–1.3)
DACRYOCYTES BLD QL SMEAR: ABNORMAL
DIFF PNL FLD: NEGATIVE — SIGNIFICANT CHANGE UP
EGFR: 107 ML/MIN/1.73M2 — SIGNIFICANT CHANGE UP
EGFR: 107 ML/MIN/1.73M2 — SIGNIFICANT CHANGE UP
EGFR: 108 ML/MIN/1.73M2 — SIGNIFICANT CHANGE UP
EGFR: 108 ML/MIN/1.73M2 — SIGNIFICANT CHANGE UP
ELLIPTOCYTES BLD QL SMEAR: ABNORMAL
EOSINOPHIL # BLD AUTO: 0.03 K/UL — SIGNIFICANT CHANGE UP (ref 0–0.5)
EOSINOPHIL # BLD AUTO: 0.1 K/UL — SIGNIFICANT CHANGE UP (ref 0–0.5)
EOSINOPHIL NFR BLD AUTO: 0.4 % — SIGNIFICANT CHANGE UP (ref 0–6)
EOSINOPHIL NFR BLD AUTO: 1.8 % — SIGNIFICANT CHANGE UP (ref 0–6)
FERRITIN SERPL-MCNC: 6 NG/ML — LOW (ref 30–400)
FOLATE SERPL-MCNC: 10.6 NG/ML — SIGNIFICANT CHANGE UP
GLUCOSE SERPL-MCNC: 114 MG/DL — HIGH (ref 70–99)
GLUCOSE SERPL-MCNC: 96 MG/DL — SIGNIFICANT CHANGE UP (ref 70–99)
GLUCOSE UR QL: NEGATIVE MG/DL — SIGNIFICANT CHANGE UP
HAPTOGLOB SERPL-MCNC: 198 MG/DL — SIGNIFICANT CHANGE UP (ref 34–200)
HCT VFR BLD CALC: 29.3 % — LOW (ref 39–50)
HCT VFR BLD CALC: 29.8 % — LOW (ref 39–50)
HGB BLD-MCNC: 8.4 G/DL — LOW (ref 13–17)
HGB BLD-MCNC: 8.5 G/DL — LOW (ref 13–17)
HYPOCHROMIA BLD QL: ABNORMAL
IMM GRANULOCYTES # BLD AUTO: 0.01 K/UL — SIGNIFICANT CHANGE UP (ref 0–0.07)
IMM GRANULOCYTES # BLD AUTO: 0.02 K/UL — SIGNIFICANT CHANGE UP (ref 0–0.07)
IMM GRANULOCYTES NFR BLD AUTO: 0.2 % — SIGNIFICANT CHANGE UP (ref 0–0.9)
IMM GRANULOCYTES NFR BLD AUTO: 0.3 % — SIGNIFICANT CHANGE UP (ref 0–0.9)
INR BLD: 1.12 RATIO — SIGNIFICANT CHANGE UP (ref 0.85–1.16)
IRON SATN MFR SERPL: 19 UG/DL — LOW (ref 59–158)
IRON SATN MFR SERPL: 5 % — LOW (ref 16–55)
KETONES UR-MCNC: 15 MG/DL
LDH SERPL L TO P-CCNC: 177 U/L — SIGNIFICANT CHANGE UP (ref 98–192)
LEUKOCYTE ESTERASE UR-ACNC: NEGATIVE — SIGNIFICANT CHANGE UP
LIDOCAIN IGE QN: 166 U/L — HIGH (ref 22–51)
LIDOCAIN IGE QN: 445 U/L — HIGH (ref 22–51)
LYMPHOCYTES # BLD AUTO: 0.89 K/UL — LOW (ref 1–3.3)
LYMPHOCYTES # BLD AUTO: 1.41 K/UL — SIGNIFICANT CHANGE UP (ref 1–3.3)
LYMPHOCYTES NFR BLD AUTO: 12.3 % — LOW (ref 13–44)
LYMPHOCYTES NFR BLD AUTO: 25.5 % — SIGNIFICANT CHANGE UP (ref 13–44)
MCHC RBC-ENTMCNC: 18 PG — LOW (ref 27–34)
MCHC RBC-ENTMCNC: 18.1 PG — LOW (ref 27–34)
MCHC RBC-ENTMCNC: 28.5 G/DL — LOW (ref 32–36)
MCHC RBC-ENTMCNC: 28.7 G/DL — LOW (ref 32–36)
MCV RBC AUTO: 63 FL — LOW (ref 80–100)
MCV RBC AUTO: 63.3 FL — LOW (ref 80–100)
MICROCYTES BLD QL: ABNORMAL
MONOCYTES # BLD AUTO: 0.67 K/UL — SIGNIFICANT CHANGE UP (ref 0–0.9)
MONOCYTES # BLD AUTO: 0.71 K/UL — SIGNIFICANT CHANGE UP (ref 0–0.9)
MONOCYTES NFR BLD AUTO: 12.8 % — SIGNIFICANT CHANGE UP (ref 2–14)
MONOCYTES NFR BLD AUTO: 9.3 % — SIGNIFICANT CHANGE UP (ref 2–14)
NEUTROPHILS # BLD AUTO: 3.28 K/UL — SIGNIFICANT CHANGE UP (ref 1.8–7.4)
NEUTROPHILS # BLD AUTO: 5.59 K/UL — SIGNIFICANT CHANGE UP (ref 1.8–7.4)
NEUTROPHILS NFR BLD AUTO: 59.2 % — SIGNIFICANT CHANGE UP (ref 43–77)
NEUTROPHILS NFR BLD AUTO: 77.3 % — HIGH (ref 43–77)
NITRITE UR-MCNC: NEGATIVE — SIGNIFICANT CHANGE UP
NRBC # BLD AUTO: 0 K/UL — SIGNIFICANT CHANGE UP (ref 0–0)
NRBC # BLD AUTO: 0 K/UL — SIGNIFICANT CHANGE UP (ref 0–0)
NRBC # FLD: 0 K/UL — SIGNIFICANT CHANGE UP (ref 0–0)
NRBC # FLD: 0 K/UL — SIGNIFICANT CHANGE UP (ref 0–0)
NRBC BLD AUTO-RTO: 0 /100 WBCS — SIGNIFICANT CHANGE UP (ref 0–0)
NRBC BLD AUTO-RTO: 0 /100 WBCS — SIGNIFICANT CHANGE UP (ref 0–0)
OVALOCYTES BLD QL SMEAR: ABNORMAL
PH UR: 6 — SIGNIFICANT CHANGE UP (ref 5–8)
PLAT MORPH BLD: NORMAL — SIGNIFICANT CHANGE UP
PLATELET # BLD AUTO: 325 K/UL — SIGNIFICANT CHANGE UP (ref 150–400)
PLATELET # BLD AUTO: 328 K/UL — SIGNIFICANT CHANGE UP (ref 150–400)
PMV BLD: 10.1 FL — SIGNIFICANT CHANGE UP (ref 7–13)
PMV BLD: 10.3 FL — SIGNIFICANT CHANGE UP (ref 7–13)
POIKILOCYTOSIS BLD QL AUTO: ABNORMAL
POLYCHROMASIA BLD QL SMEAR: SLIGHT — SIGNIFICANT CHANGE UP
POTASSIUM SERPL-MCNC: 4 MMOL/L — SIGNIFICANT CHANGE UP (ref 3.5–5.3)
POTASSIUM SERPL-MCNC: 4.8 MMOL/L — SIGNIFICANT CHANGE UP (ref 3.5–5.3)
POTASSIUM SERPL-SCNC: 4 MMOL/L — SIGNIFICANT CHANGE UP (ref 3.5–5.3)
POTASSIUM SERPL-SCNC: 4.8 MMOL/L — SIGNIFICANT CHANGE UP (ref 3.5–5.3)
PROT SERPL-MCNC: 6.5 G/DL — LOW (ref 6.6–8.7)
PROT UR-MCNC: SIGNIFICANT CHANGE UP MG/DL
PROTHROM AB SERPL-ACNC: 13 SEC — SIGNIFICANT CHANGE UP (ref 9.9–13.4)
RBC # BLD: 4.63 M/UL — SIGNIFICANT CHANGE UP (ref 4.2–5.8)
RBC # BLD: 4.73 M/UL — SIGNIFICANT CHANGE UP (ref 4.2–5.8)
RBC # FLD: 19.4 % — HIGH (ref 10.3–14.5)
RBC # FLD: 19.8 % — HIGH (ref 10.3–14.5)
RBC BLD AUTO: ABNORMAL
SCHISTOCYTES BLD QL AUTO: SLIGHT — SIGNIFICANT CHANGE UP
SODIUM SERPL-SCNC: 136 MMOL/L — SIGNIFICANT CHANGE UP (ref 135–145)
SODIUM SERPL-SCNC: 137 MMOL/L — SIGNIFICANT CHANGE UP (ref 135–145)
SP GR SPEC: >1.03 — HIGH (ref 1–1.03)
TIBC SERPL-MCNC: 409 UG/DL — SIGNIFICANT CHANGE UP (ref 220–430)
TRANSFERRIN SERPL-MCNC: 286 MG/DL — SIGNIFICANT CHANGE UP (ref 180–329)
UROBILINOGEN FLD QL: 1 MG/DL — SIGNIFICANT CHANGE UP (ref 0.2–1)
VIT B12 SERPL-MCNC: 334 PG/ML — SIGNIFICANT CHANGE UP (ref 232–1245)
WBC # BLD: 5.54 K/UL — SIGNIFICANT CHANGE UP (ref 3.8–10.5)
WBC # BLD: 7.23 K/UL — SIGNIFICANT CHANGE UP (ref 3.8–10.5)
WBC # FLD AUTO: 5.54 K/UL — SIGNIFICANT CHANGE UP (ref 3.8–10.5)
WBC # FLD AUTO: 7.23 K/UL — SIGNIFICANT CHANGE UP (ref 3.8–10.5)

## 2025-03-23 PROCEDURE — 74420 UROGRAPHY RTRGR +-KUB: CPT | Mod: 26,RT

## 2025-03-23 PROCEDURE — 99222 1ST HOSP IP/OBS MODERATE 55: CPT | Mod: 25,57

## 2025-03-23 PROCEDURE — 52332 CYSTOSCOPY AND TREATMENT: CPT | Mod: RT

## 2025-03-23 PROCEDURE — 74177 CT ABD & PELVIS W/CONTRAST: CPT | Mod: 26

## 2025-03-23 PROCEDURE — 99223 1ST HOSP IP/OBS HIGH 75: CPT

## 2025-03-23 RX ORDER — TAMSULOSIN HYDROCHLORIDE 0.4 MG/1
0.4 CAPSULE ORAL AT BEDTIME
Refills: 0 | Status: DISCONTINUED | OUTPATIENT
Start: 2025-03-23 | End: 2025-03-25

## 2025-03-23 RX ORDER — MAGNESIUM, ALUMINUM HYDROXIDE 200-200 MG
30 TABLET,CHEWABLE ORAL EVERY 4 HOURS
Refills: 0 | Status: DISCONTINUED | OUTPATIENT
Start: 2025-03-23 | End: 2025-03-23

## 2025-03-23 RX ORDER — IRON SUCROSE 20 MG/ML
100 INJECTION, SOLUTION INTRAVENOUS
Refills: 0 | Status: COMPLETED | OUTPATIENT
Start: 2025-03-23 | End: 2025-03-23

## 2025-03-23 RX ORDER — INFLUENZA A VIRUS A/IDAHO/07/2018 (H1N1) ANTIGEN (MDCK CELL DERIVED, PROPIOLACTONE INACTIVATED, INFLUENZA A VIRUS A/INDIANA/08/2018 (H3N2) ANTIGEN (MDCK CELL DERIVED, PROPIOLACTONE INACTIVATED), INFLUENZA B VIRUS B/SINGAPORE/INFTT-16-0610/2016 ANTIGEN (MDCK CELL DERIVED, PROPIOLACTONE INACTIVATED), INFLUENZA B VIRUS B/IOWA/06/2017 ANTIGEN (MDCK CELL DERIVED, PROPIOLACTONE INACTIVATED) 15; 15; 15; 15 UG/.5ML; UG/.5ML; UG/.5ML; UG/.5ML
0.5 INJECTION, SUSPENSION INTRAMUSCULAR ONCE
Refills: 0 | Status: DISCONTINUED | OUTPATIENT
Start: 2025-03-23 | End: 2025-03-25

## 2025-03-23 RX ORDER — ONDANSETRON HCL/PF 4 MG/2 ML
4 VIAL (ML) INJECTION EVERY 8 HOURS
Refills: 0 | Status: DISCONTINUED | OUTPATIENT
Start: 2025-03-23 | End: 2025-03-23

## 2025-03-23 RX ORDER — TAMSULOSIN HYDROCHLORIDE 0.4 MG/1
0.4 CAPSULE ORAL AT BEDTIME
Refills: 0 | Status: DISCONTINUED | OUTPATIENT
Start: 2025-03-23 | End: 2025-03-23

## 2025-03-23 RX ORDER — CEFTRIAXONE 500 MG/1
1000 INJECTION, POWDER, FOR SOLUTION INTRAMUSCULAR; INTRAVENOUS EVERY 24 HOURS
Refills: 0 | Status: COMPLETED | OUTPATIENT
Start: 2025-03-23 | End: 2025-03-25

## 2025-03-23 RX ORDER — SODIUM CHLORIDE 9 G/1000ML
1000 INJECTION, SOLUTION INTRAVENOUS
Refills: 0 | Status: DISCONTINUED | OUTPATIENT
Start: 2025-03-23 | End: 2025-03-23

## 2025-03-23 RX ORDER — ONDANSETRON HCL/PF 4 MG/2 ML
4 VIAL (ML) INJECTION ONCE
Refills: 0 | Status: DISCONTINUED | OUTPATIENT
Start: 2025-03-23 | End: 2025-03-23

## 2025-03-23 RX ORDER — IRON SUCROSE 20 MG/ML
100 INJECTION, SOLUTION INTRAVENOUS EVERY 24 HOURS
Refills: 0 | Status: DISCONTINUED | OUTPATIENT
Start: 2025-03-23 | End: 2025-03-23

## 2025-03-23 RX ORDER — ACETAMINOPHEN 500 MG/5ML
650 LIQUID (ML) ORAL EVERY 6 HOURS
Refills: 0 | Status: DISCONTINUED | OUTPATIENT
Start: 2025-03-23 | End: 2025-03-23

## 2025-03-23 RX ORDER — ONDANSETRON HCL/PF 4 MG/2 ML
4 VIAL (ML) INJECTION EVERY 6 HOURS
Refills: 0 | Status: DISCONTINUED | OUTPATIENT
Start: 2025-03-23 | End: 2025-03-25

## 2025-03-23 RX ORDER — CYANOCOBALAMIN 1000 UG/ML
1000 INJECTION INTRAMUSCULAR; SUBCUTANEOUS DAILY
Refills: 0 | Status: DISCONTINUED | OUTPATIENT
Start: 2025-03-23 | End: 2025-03-23

## 2025-03-23 RX ORDER — CEFTRIAXONE 500 MG/1
1000 INJECTION, POWDER, FOR SOLUTION INTRAMUSCULAR; INTRAVENOUS EVERY 24 HOURS
Refills: 0 | Status: DISCONTINUED | OUTPATIENT
Start: 2025-03-23 | End: 2025-03-23

## 2025-03-23 RX ORDER — MELATONIN 5 MG
3 TABLET ORAL AT BEDTIME
Refills: 0 | Status: DISCONTINUED | OUTPATIENT
Start: 2025-03-23 | End: 2025-03-23

## 2025-03-23 RX ORDER — FENTANYL CITRATE-0.9 % NACL/PF 100MCG/2ML
50 SYRINGE (ML) INTRAVENOUS
Refills: 0 | Status: DISCONTINUED | OUTPATIENT
Start: 2025-03-23 | End: 2025-03-23

## 2025-03-23 RX ORDER — MAGNESIUM, ALUMINUM HYDROXIDE 200-200 MG
30 TABLET,CHEWABLE ORAL EVERY 4 HOURS
Refills: 0 | Status: DISCONTINUED | OUTPATIENT
Start: 2025-03-23 | End: 2025-03-25

## 2025-03-23 RX ORDER — ONDANSETRON HCL/PF 4 MG/2 ML
4 VIAL (ML) INJECTION ONCE
Refills: 0 | Status: DISCONTINUED | OUTPATIENT
Start: 2025-03-23 | End: 2025-03-24

## 2025-03-23 RX ORDER — HYDROMORPHONE/SOD CHLOR,ISO/PF 2 MG/10 ML
0.5 SYRINGE (ML) INJECTION
Refills: 0 | Status: DISCONTINUED | OUTPATIENT
Start: 2025-03-23 | End: 2025-03-23

## 2025-03-23 RX ORDER — MELATONIN 5 MG
3 TABLET ORAL AT BEDTIME
Refills: 0 | Status: DISCONTINUED | OUTPATIENT
Start: 2025-03-23 | End: 2025-03-25

## 2025-03-23 RX ORDER — CYANOCOBALAMIN 1000 UG/ML
1000 INJECTION INTRAMUSCULAR; SUBCUTANEOUS DAILY
Refills: 0 | Status: DISCONTINUED | OUTPATIENT
Start: 2025-03-23 | End: 2025-03-25

## 2025-03-23 RX ORDER — ACETAMINOPHEN 500 MG/5ML
975 LIQUID (ML) ORAL EVERY 6 HOURS
Refills: 0 | Status: DISCONTINUED | OUTPATIENT
Start: 2025-03-23 | End: 2025-03-24

## 2025-03-23 RX ADMIN — CEFTRIAXONE 1000 MILLIGRAM(S): 500 INJECTION, POWDER, FOR SOLUTION INTRAMUSCULAR; INTRAVENOUS at 04:32

## 2025-03-23 RX ADMIN — Medication 40 MILLIGRAM(S): at 17:00

## 2025-03-23 RX ADMIN — Medication 4 MILLIGRAM(S): at 00:10

## 2025-03-23 RX ADMIN — Medication 975 MILLIGRAM(S): at 13:02

## 2025-03-23 RX ADMIN — KETOROLAC TROMETHAMINE 15 MILLIGRAM(S): 30 INJECTION, SOLUTION INTRAMUSCULAR; INTRAVENOUS at 00:11

## 2025-03-23 RX ADMIN — CYANOCOBALAMIN 1000 MICROGRAM(S): 1000 INJECTION INTRAMUSCULAR; SUBCUTANEOUS at 12:02

## 2025-03-23 RX ADMIN — CEFTRIAXONE 1000 MILLIGRAM(S): 500 INJECTION, POWDER, FOR SOLUTION INTRAMUSCULAR; INTRAVENOUS at 12:02

## 2025-03-23 RX ADMIN — Medication 650 MILLIGRAM(S): at 05:32

## 2025-03-23 RX ADMIN — Medication 975 MILLIGRAM(S): at 17:01

## 2025-03-23 RX ADMIN — Medication 1000 MILLILITER(S): at 00:10

## 2025-03-23 RX ADMIN — Medication 4 MILLIGRAM(S): at 04:42

## 2025-03-23 RX ADMIN — Medication 4 MILLIGRAM(S): at 00:25

## 2025-03-23 RX ADMIN — KETOROLAC TROMETHAMINE 15 MILLIGRAM(S): 30 INJECTION, SOLUTION INTRAMUSCULAR; INTRAVENOUS at 00:25

## 2025-03-23 RX ADMIN — Medication 3 MILLIGRAM(S): at 21:13

## 2025-03-23 RX ADMIN — TAMSULOSIN HYDROCHLORIDE 0.4 MILLIGRAM(S): 0.4 CAPSULE ORAL at 21:13

## 2025-03-23 RX ADMIN — Medication 650 MILLIGRAM(S): at 04:32

## 2025-03-23 RX ADMIN — Medication 975 MILLIGRAM(S): at 17:31

## 2025-03-23 RX ADMIN — Medication 100 MILLILITER(S): at 06:24

## 2025-03-23 RX ADMIN — IRON SUCROSE 210 MILLIGRAM(S): 20 INJECTION, SOLUTION INTRAVENOUS at 12:06

## 2025-03-23 RX ADMIN — Medication 40 MILLIGRAM(S): at 05:23

## 2025-03-23 RX ADMIN — Medication 975 MILLIGRAM(S): at 12:02

## 2025-03-23 RX ADMIN — Medication 100 MILLILITER(S): at 04:32

## 2025-03-23 NOTE — CONSULT NOTE ADULT - SUBJECTIVE AND OBJECTIVE BOX
HPI: Pt is a 60y Male with no significant PMHx who presents to the ED with RLQ and right flank pain. This is the third visit to the ED for similar complaints in 5 days (second MRN: [id number]). Pt collectively has been complaining of pain with associated N/V and decreased appetite. On all visits, pt has been afebrile, has had a normal WBC, and normal creatinine. Today, pt has elevated lipase of 166, which was previously WNL. Pt denies any other sx including CP, SOB, hematuria, frequency, or urgency.     PAST MEDICAL & SURGICAL HISTORY: none     FAMILY HISTORY:  No known  malignancy   Denies alcohol and drug abuse, nonsmoker       No Known Allergies    REVIEW OF SYSTEMS: Pertinent positives and negatives as stated in HPI, otherwise negative    Vital signs  T(C): 36.6 (03-22-25 @ 22:07), Max: 36.6 (03-22-25 @ 22:07)  HR: 80 (03-22-25 @ 22:07)  BP: 134/77 (03-22-25 @ 22:07)  SpO2: 99% (03-22-25 @ 22:07)  Wt(kg): --    Physical Exam  Gen: NAD  Pulm: norespiratory distress, no subcostal retractions, no accessory muscle use   CV:  RRR, no JVD  Abd: Soft, NT, ND, no rebound tenderness or guarding  : Mild right CVAT  MSK:  Moving all extremities, full ROM in all extremities, No edema present  NEURO: A&Ox3, no focal neurological deficits, CN 2-12 grossly intact  SKIN: warm, dry, no rash.    LABS:  CBC                       8.5    7.23  )-----------( 328      ( 23 Mar 2025 00:13 )             29.8     BMP   03-23    136  |  102  |  11.6  ----------------------------<  114[H]  4.8   |  23.0  |  0.68    Ca    8.3[L]      23 Mar 2025 00:13    TPro  6.5[L]  /  Alb  3.5  /  TBili  0.2[L]  /  DBili  x   /  AST  25  /  ALT  <5  /  AlkPhos  65  03-23        Urinalysis Basic - ( 23 Mar 2025 00:13 )    Color: x / Appearance: x / SG: x / pH: x  Gluc: 114 mg/dL / Ketone: x  / Bili: x / Urobili: x   Blood: x / Protein: x / Nitrite: x   Leuk Esterase: x / RBC: x / WBC x   Sq Epi: x / Non Sq Epi: x / Bacteria: x      Urine Cx:   Urinalysis Basic - ( 23 Mar 2025 00:13 )    Color: x / Appearance: x / SG: x / pH: x  Gluc: 114 mg/dL / Ketone: x  / Bili: x / Urobili: x   Blood: x / Protein: x / Nitrite: x   Leuk Esterase: x / RBC: x / WBC x   Sq Epi: x / Non Sq Epi: x / Bacteria: x      Radiology:  < from: CT Abdomen and Pelvis w/ IV Cont (03.23.25 @ 00:39) >    ACC: [id number] EXAM:  CT ABDOMEN AND PELVIS IC   ORDERED BY: LILIYA TAO     PROCEDURE DATE:  03/23/2025          INTERPRETATION:  CLINICAL INFORMATION: Right lower quadrant pain.    COMPARISON: None.    CONTRAST/COMPLICATIONS:  IV Contrast: Omnipaque 350  90 cc administered   10 cc discarded  Oral Contrast: NONE  .    PROCEDURE:  CT of the Abdomen and Pelvis was performed.  Sagittal and coronal reformats were performed.    FINDINGS:  LOWER CHEST: Mild bibasilar dependent atelectasis. Metallic fragment in   the left lateral chest wall.    LIVER: Mild hepatic steatosis along the falciform ligament.  BILE DUCTS: Normal caliber.  GALLBLADDER: Within normal limits.  SPLEEN: Within normal limits.  PANCREAS: Within normal limits.  ADRENALS: Within normal limits.  KIDNEYS/URETERS: Kidneys enhance symmetrically. Duplicated right renal   collecting system with convergence of the upper pole and lower pole   ureters at the level of the mid ureter. Mild right-sided hydroureter in   comparison to the left secondary to a 5 mm calculus in the distal right   ureter (series 3:112). Mild asymmetric right-sided perinephric stranding.    BLADDER: Within normal limits.  REPRODUCTIVE ORGANS: Prostate is within normal limits.    BOWEL: No bowel obstruction. Appendix is normal. Thickening of the walls   of the gastric antrum and duodenal bulb with focal ulceration/outpouching   along the lesser curvature. Mild surrounding infiltration of the fat.  PERITONEUM/RETROPERITONEUM: No ascites, pneumoperitoneum, or loculated   collection.  VESSELS: Within normal limits.  LYMPH NODES: Mildly enlarged periportal and perigastric lymph nodes;   representative lymph node measuring 1.7 x 0.9 cm (series 3:37).  ABDOMINAL WALL: Small fat-containing umbilical hernia.  BONES: Degenerative changes of the spine.    IMPRESSION:  Duplicated right renal collecting system with convergence of the upper   pole and lower pole ureters at the level of the mid ureter. Mild   right-sided hydroureter in comparison to theleft secondary to a 5 mm   calculus in the distal right ureter (series 3:112). Mild asymmetric   right-sided perinephric stranding.    Thickening of the walls of the gastric antrum and duodenal bulb with   focal ulceration/outpouching along the lesser curvature. Mild surrounding   infiltration of the fat and a few adjacent enlarged lymph nodes.   Considerations include gastritis/duodenitis and peptic ulcer disease or   gastric malignancy with ulceration. Recommend follow-up with   gastroenterology and endoscopy.      --- End of Report ---    < end of copied text >

## 2025-03-23 NOTE — H&P ADULT - HISTORY OF PRESENT ILLNESS
59 yo male with no known pmhx presenting to the ED with RLQ and R flank pain. Patient was initially seen in the ED for these similar complaints on 3/18  59 yo male with no known pmhx presenting to the ED with RLQ and R flank pain. Patient's symptoms started 3/15 and was initially seen in the ED for these similar complaints on 3/18. At that time, patient had mild hydro on CT, but no hydro visualized on US. He was discharged with Flomax and urology follow up. He returned 3/22 am, was discharged and returned again 3/22 pm as pain was not improving. Over these few days, hgb has also been dropping 9.7 -> 9.1 -> 8.5. He denies blood in the stool or urine, denies hematemesis or hemoptysis,  59 yo male with no known pmhx presenting to the ED with RLQ and R flank pain. Patient's symptoms started 3/15 and was initially seen in the ED for these similar complaints on 3/18. At that time, patient had mild hydro on CT, but no hydro visualized on US. He was discharged with Flomax and urology follow up. He returned 3/22 am, was discharged and returned again 3/22 pm as pain was not improving. Over these few days, hgb has also been dropping 9.7 -> 9.1 -> 8.5. He denies blood in the stool or urine, denies hematemesis or hemoptysis, He has never had EGD or colonoscopy in the past.

## 2025-03-23 NOTE — CONSULT NOTE ADULT - NS ATTEND AMEND GEN_ALL_CORE FT
agree with recommendations   will plan for right ureteral stent insertion while patient is also being evaluated for other findings

## 2025-03-23 NOTE — ED ADULT NURSE NOTE - OBJECTIVE STATEMENT
c/o right sided flank pain with nausea/vomiting. recently dx with kidney stones. denies fever, chills, dysuria, hematuria, weakness. took ibuprofen prior to arrival.

## 2025-03-23 NOTE — PROGRESS NOTE ADULT - SUBJECTIVE AND OBJECTIVE BOX
Post-op Check    Subjective:  Pt offers no acute complaints at this time. Pain well controlled on current regiment. Denies nausea, vomiting, chest pain, SOB, palpitations.     STATUS POST:  cystoscopy and stent    POST OPERATIVE DAY #: 0    MEDICATIONS  (STANDING):  acetaminophen     Tablet .. 975 milliGRAM(s) Oral every 6 hours  cefTRIAXone Injectable. 1000 milliGRAM(s) IV Push every 24 hours  cyanocobalamin 1000 MICROGram(s) Oral daily  influenza   Vaccine 0.5 milliLiter(s) IntraMuscular once  ondansetron Injectable 4 milliGRAM(s) IV Push once  pantoprazole    Tablet 40 milliGRAM(s) Oral two times a day  tamsulosin 0.4 milliGRAM(s) Oral at bedtime    MEDICATIONS  (PRN):  aluminum hydroxide/magnesium hydroxide/simethicone Suspension 30 milliLiter(s) Oral every 4 hours PRN Dyspepsia  melatonin 3 milliGRAM(s) Oral at bedtime PRN Insomnia  ondansetron Injectable 4 milliGRAM(s) IV Push every 6 hours PRN Nausea and/or Vomiting      Vital Signs Last 24 Hrs  T(C): 36.7 (23 Mar 2025 12:00), Max: 36.8 (23 Mar 2025 05:10)  T(F): 98 (23 Mar 2025 12:00), Max: 98.2 (23 Mar 2025 05:10)  HR: 73 (23 Mar 2025 12:00) (59 - 80)  BP: 128/69 (23 Mar 2025 12:00) (114/86 - 135/75)  BP(mean): 91 (23 Mar 2025 11:15) (91 - 103)  RR: 18 (23 Mar 2025 12:00) (16 - 18)  SpO2: 100% (23 Mar 2025 12:00) (99% - 100%)    Parameters below as of 23 Mar 2025 12:00  Patient On (Oxygen Delivery Method): room air        Physical Exam:    General: Laying comfortably in bed, NAD  HEENT: PERRL, EOMI  Neck: No JVD, FROM without pain  Respiratory: no accessory muscle use, respirations non-labored  Abdomen: soft, NT/ND, no CVA tenderness  Neurological: A&O x 3; without gross deficit    A: 60M with 5mm r ureteral stone now s/p cystoscopy and stent. clinically well    P:  Continue current care  awaiting results of urine culture  continue abx  Pain control  OOB as tolerated  Encourage IS  DVT ppx

## 2025-03-23 NOTE — CONSULT NOTE ADULT - ASSESSMENT
60y Male with no significant PMHx who presents to the ED with RLQ and right flank pain.with associated N/V and decreased appetite, elevated lipase of 166 CT reveal R hydronephrosis> uretal stone  S/P OR w/ Urology for stent placement this am, GI consulted thickened antrum of stomach w/ gastric nodes , needs diag  EGD/EUS for biopsy    #Abnormal CT findings> gastric antrum thickening  NPO after midnight  Active T&S, Coags, CBC,CMP  Plan for EGD EUS am

## 2025-03-23 NOTE — H&P ADULT - ASSESSMENT
59 yo male with no known pmhx presenting to the ED with RLQ and R flank pain. CT showing mild hydronephrosis with 5 mm renal stone. Additional findings include thickening of the antrum and duodenal bulb with focal ulceration/outpouching along the lesser curvature with enlarged lymph nodes.    Right Ureteral Stone with Mild Hydronephrosis   -Admit to medicine  -No signs of infection, afebrile, no leukocytosis   -Seen by urology, possible stent placement in am 3/23- will keep NPO  -Continue Flomax  -UA pending, will start antibiotic ppx for stenting with Ceftriaxone 1 gm daily  -Continue NS @ 100 cc/hr  -Pain control    Gastric Thickening with Surrounding Lymph Nodes  Anemia   -CT showing thickening of the antrum and duodenal bulb with focal ulceration/outpouching along the lesser curvature with enlarged lymph nodes; consider gastritis/duodenitis and peptic ulcer disease or gastric malignancy  -Hgb downtrending 9.7 -> 9.1 -> 8.5  -Patient has not had colo/EGD in the past  -Denies blood in stool/urine, no hematemesis   -GI consult  -IV Protonix for now  -NPO as above     VTEppx: SCDs  Dispo: D/c home, likely 2-3 days after stent placement and GI eval 59 yo male with no known pmhx presenting to the ED with RLQ and R flank pain. CT showing mild hydronephrosis with 5 mm renal stone. Additional findings include thickening of the antrum and duodenal bulb with focal ulceration/outpouching along the lesser curvature with enlarged lymph nodes.    Right Ureteral Stone with Mild Hydronephrosis   -Admit to medicine  -No signs of infection, afebrile, no leukocytosis   -Seen by urology, possible stent placement in am 3/23- will keep NPO  -Continue Flomax  -UA pending, will start antibiotic ppx for stenting with Ceftriaxone 1 gm daily  -Continue NS @ 100 cc/hr  -Pain control    Gastric Thickening with Surrounding Lymph Nodes  Anemia   -CT showing thickening of the antrum and duodenal bulb with focal ulceration/outpouching along the lesser curvature with enlarged lymph nodes; consider gastritis/duodenitis and peptic ulcer disease or gastric malignancy  -Hgb downtrending 9.7 -> 9.1 -> 8.5  -Patient has not had colo/EGD in the past  -Denies blood in stool/urine, no hematemesis   -GI consult  -IV Protonix for now  -NPO as above   -Check anemia labs    VTEppx: SCDs  Dispo: D/c home, likely 2-3 days after stent placement and GI eval 59 yo male with no known pmhx presenting to the ED with RLQ and R flank pain. CT showing mild hydronephrosis with 5 mm renal stone. Additional findings include thickening of the antrum and duodenal bulb with focal ulceration/outpouching along the lesser curvature with enlarged lymph nodes.    Right Ureteral Stone with Mild Hydronephrosis   -Admit to medicine  -No signs of infection, afebrile, no leukocytosis   -Seen by urology, possible stent placement in am 3/23- will keep NPO  -Continue Flomax  -UA pending, will start antibiotic ppx for stenting with Ceftriaxone 1 gm daily  -Continue NS @ 100 cc/hr  -Pain control    Gastric Thickening with Surrounding Lymph Nodes  Microcytic Anemia   -CT showing thickening of the antrum and duodenal bulb with focal ulceration/outpouching along the lesser curvature with enlarged lymph nodes; consider gastritis/duodenitis and peptic ulcer disease or gastric malignancy  -Hgb downtrending 9.7 -> 9.1 -> 8.5  -Patient has not had colo/EGD in the past  -Denies blood in stool/urine, no hematemesis   -GI consult  -IV Protonix for now  -NPO as above   -Check anemia labs    VTEppx: SCDs  Dispo: D/c home, likely 2-3 days after stent placement and GI eval

## 2025-03-23 NOTE — CONSULT NOTE ADULT - NS ATTEND AMEND GEN_ALL_CORE FT
Mr. Susan Jerez is a 60 year old gentleman who initially presented for right lower quadrant pain in the setting of right sided hydronephrosis s/p nephrostomy placement incidentally noted to have marked gastric/duodenal wall thickening and concerns for ulceration (non-perforating) and lymphadenopathy concerning for PUD versus possible malignancy for which GI consulted. Patient denies any abdominal pain or predisposing factors for PUD, does have intermittent nausea but not currently. Admits to diminished appetite and some weight loss. Patient denies history of H. Pylori. No family history of gastrointestinal malignancy. No prior endoscopic evaluation. Mild elevation in Lipase likely in the setting of inflammatory changes seen in small bowel unlikely pancreatic in origin. Images and updated labs interpreted in detail. Discussed plan of care with patient and covering provider. All questions answered and concerns addressed. Continue supportive care. We will continue to follow along with you.     Plan:   - Keep NPO at midnight, OK for regular diet today  - EGD +/- EUS with biopsies tomorrow   - Would start PPI PO BID   - Anti-emetic PRN  - Sent tumor markers empirically   - Additional recommendations to follow post procedure.

## 2025-03-23 NOTE — H&P ADULT - NSHPLABSRESULTS_GEN_ALL_CORE
8.5    7.23  )-----------( 328      ( 23 Mar 2025 00:13 )             29.8     23 Mar 2025 00:13    136    |  102    |  11.6   ----------------------------<  114    4.8     |  23.0   |  0.68     Ca    8.3        23 Mar 2025 00:13    TPro  6.5    /  Alb  3.5    /  TBili  0.2    /  DBili  x      /  AST  25     /  ALT  <5     /  AlkPhos  65     23 Mar 2025 00:13      CAPILLARY BLOOD GLUCOSE        LIVER FUNCTIONS - ( 23 Mar 2025 00:13 )  Alb: 3.5 g/dL / Pro: 6.5 g/dL / ALK PHOS: 65 U/L / ALT: <5 U/L / AST: 25 U/L / GGT: x           Urinalysis Basic - ( 23 Mar 2025 00:13 )    Color: x / Appearance: x / SG: x / pH: x  Gluc: 114 mg/dL / Ketone: x  / Bili: x / Urobili: x   Blood: x / Protein: x / Nitrite: x   Leuk Esterase: x / RBC: x / WBC x   Sq Epi: x / Non Sq Epi: x / Bacteria: x 8.5    7.23  )-----------( 328      ( 23 Mar 2025 00:13 )             29.8     23 Mar 2025 00:13    136    |  102    |  11.6   ----------------------------<  114    4.8     |  23.0   |  0.68     Ca    8.3        23 Mar 2025 00:13    TPro  6.5    /  Alb  3.5    /  TBili  0.2    /  DBili  x      /  AST  25     /  ALT  <5     /  AlkPhos  65     23 Mar 2025 00:13      CAPILLARY BLOOD GLUCOSE        LIVER FUNCTIONS - ( 23 Mar 2025 00:13 )  Alb: 3.5 g/dL / Pro: 6.5 g/dL / ALK PHOS: 65 U/L / ALT: <5 U/L / AST: 25 U/L / GGT: x           Urinalysis Basic - ( 23 Mar 2025 00:13 )    Color: x / Appearance: x / SG: x / pH: x  Gluc: 114 mg/dL / Ketone: x  / Bili: x / Urobili: x   Blood: x / Protein: x / Nitrite: x   Leuk Esterase: x / RBC: x / WBC x   Sq Epi: x / Non Sq Epi: x / Bacteria: x      < from: CT Abdomen and Pelvis w/ IV Cont (03.23.25 @ 00:39) >    IMPRESSION:  Duplicated right renal collecting system with convergence of the upper   pole and lower pole ureters at the level of the mid ureter. Mild   right-sided hydroureter in comparison to the left secondary to a 5 mm   calculus in the distal right ureter (series 3:112). Mild asymmetric   right-sided perinephric stranding.    Thickening of the walls of the gastric antrum and duodenal bulb with   focal ulceration/outpouching along the lesser curvature. Mild surrounding   infiltration of the fat and a few adjacent enlarged lymph nodes.   Considerations include gastritis/duodenitis and peptic ulcer disease or   gastric malignancy with ulceration. Recommend follow-up with   gastroenterology and endoscopy.    < end of copied text >

## 2025-03-23 NOTE — CONSULT NOTE ADULT - SUBJECTIVE AND OBJECTIVE BOX
Patient is a 60y old  Male who presents with a chief complaint of Kidney Stone with Hydronephrosis; abnormal CT finding in stomach (23 Mar 2025 03:08)      HPI:  59 yo male with no known pmhx presenting to the ED with RLQ and R flank pain. Patient's symptoms started 3/15 and was initially seen in the ED for these similar complaints on 3/18. At that time, patient had mild hydro on CT, but no hydro visualized on US. He was discharged with Flomax and urology follow up. He returned 3/22 am, was discharged and returned again 3/22 pm as pain was not improving. Over these few days, hgb has also been dropping 9.7 -> 9.1 -> 8.5. He denies blood in the stool or urine, denies hematemesis or hemoptysis, He has never had EGD or colonoscopy in the past. (23 Mar 2025 03:08)      REVIEW OF SYSTEMS:  Constitutional: No fever, weight loss or fatigue  ENMT:  No difficulty hearing, tinnitus, vertigo; No sinus or throat pain  Respiratory: No cough, wheezing, chills or hemoptysis  Cardiovascular: No chest pain, palpitations, dizziness or leg swelling  Gastrointestinal: No abdominal or epigastric pain. No nausea, vomiting or hematemesis;   No diarrhea or constipation. No melena or hematochezia.  Skin: No itching, burning, rashes or lesions   Musculoskeletal: No joint pain or swelling; No muscle, back or extremity pain    PAST MEDICAL & SURGICAL HISTORY:  No pertinent past medical history      No significant past surgical history          FAMILY HISTORY:  No pertinent family history in first degree relatives        SOCIAL HISTORY:  Smoking Status: [ ] Current, [ ] Former, [ ] Never  Pack Years:  [  ] EtOH  [  ] IVDA    MEDICATIONS:  MEDICATIONS  (STANDING):  acetaminophen     Tablet .. 975 milliGRAM(s) Oral every 6 hours  cefTRIAXone Injectable. 1000 milliGRAM(s) IV Push every 24 hours  cyanocobalamin 1000 MICROGram(s) Oral daily  influenza   Vaccine 0.5 milliLiter(s) IntraMuscular once  ondansetron Injectable 4 milliGRAM(s) IV Push once  pantoprazole    Tablet 40 milliGRAM(s) Oral two times a day  tamsulosin 0.4 milliGRAM(s) Oral at bedtime    MEDICATIONS  (PRN):  aluminum hydroxide/magnesium hydroxide/simethicone Suspension 30 milliLiter(s) Oral every 4 hours PRN Dyspepsia  melatonin 3 milliGRAM(s) Oral at bedtime PRN Insomnia  ondansetron Injectable 4 milliGRAM(s) IV Push every 6 hours PRN Nausea and/or Vomiting      Allergies    No Known Allergies    Intolerances        Vital Signs Last 24 Hrs  T(C): 36.7 (23 Mar 2025 12:00), Max: 36.8 (23 Mar 2025 05:10)  T(F): 98 (23 Mar 2025 12:00), Max: 98.2 (23 Mar 2025 05:10)  HR: 73 (23 Mar 2025 12:00) (59 - 80)  BP: 128/69 (23 Mar 2025 12:00) (114/86 - 135/75)  BP(mean): 91 (23 Mar 2025 11:15) (91 - 103)  RR: 18 (23 Mar 2025 12:00) (16 - 18)  SpO2: 100% (23 Mar 2025 12:00) (99% - 100%)    Parameters below as of 23 Mar 2025 12:00  Patient On (Oxygen Delivery Method): room air        03-23 @ 07:01  -  03-23 @ 12:13  --------------------------------------------------------  IN: 250 mL / OUT: 150 mL / NET: 100 mL          PHYSICAL EXAM:    General: Well developed; well nourished; in no acute distress  HEENT: MMM, conjunctiva and sclera clear  Gastrointestinal: Soft, non-tender non-distended; Normal bowel sounds; No rebound or guarding  Extremities: Normal range of motion, No clubbing, cyanosis or edema  Neurological: Alert and oriented x3  Skin: Warm and dry. No obvious rash      LABS:                        8.4    5.54  )-----------( 325      ( 23 Mar 2025 06:01 )             29.3           Ferritin: 6 ng/mL (03-23-25 @ 05:42)  Iron Total: 19 ug/dL (03-23-25 @ 05:42)  Iron - Total Binding Capacity.: 409 ug/dL (03-23-25 @ 05:42)        RADIOLOGY & ADDITIONAL STUDIES:       < from: CT Abdomen and Pelvis w/ IV Cont (03.23.25 @ 00:39) >  PROCEDURE:  CT of the Abdomen and Pelvis was performed.  Sagittal and coronal reformats were performed.    FINDINGS:  LOWER CHEST: Mild bibasilar dependent atelectasis. Metallic fragment in   the left lateral chest wall.    LIVER: Mild hepatic steatosis along the falciform ligament.  BILE DUCTS: Normal caliber.  GALLBLADDER: Within normal limits.  SPLEEN: Within normal limits.  PANCREAS: Within normal limits.  ADRENALS: Within normal limits.  KIDNEYS/URETERS: Kidneys enhance symmetrically. Duplicated right renal   collecting system with convergence of the upper pole and lower pole   ureters at the level of the mid ureter. Mild right-sided hydroureter in   comparison to the left secondary to a 5 mm calculus in the distal right   ureter (series 3:112). Mild asymmetric right-sided perinephric stranding.    BLADDER: Within normal limits.  REPRODUCTIVE ORGANS: Prostate is within normal limits.    BOWEL: No bowel obstruction. Appendix is normal. Thickening of the walls   of the gastric antrum and duodenal bulb with focal ulceration/outpouching   along the lesser curvature. Mild surrounding infiltration of the fat.  PERITONEUM/RETROPERITONEUM: No ascites, pneumoperitoneum, or loculated   collection.  VESSELS: Within normal limits.  LYMPH NODES: Mildly enlarged periportal and perigastric lymph nodes;   representative lymph node measuring 1.7 x 0.9 cm (series 3:37).  ABDOMINAL WALL: Small fat-containing umbilical hernia.  BONES: Degenerative changes of the spine.    IMPRESSION:  Duplicated right renal collecting system with convergence of the upper   pole and lower pole ureters at the level of the mid ureter. Mild   right-sided hydroureter in comparison to theleft secondary to a 5 mm   calculus in the distal right ureter (series 3:112). Mild asymmetric   right-sided perinephric stranding.    Thickening of the walls of the gastric antrum and duodenal bulb with   focal ulceration/outpouching along the lesser curvature. Mild surrounding   infiltration of the fat and a few adjacent enlarged lymph nodes.   Considerations include gastritis/duodenitis and peptic ulcer disease or   gastric malignancy with ulceration. Recommend follow-up with   gastroenterology and endoscopy.    < end of copied text >   Patient is a 60y old  Male who presents with a chief complaint of Kidney Stone with Hydronephrosis; abnormal CT finding in stomach (23 Mar 2025 03:08)      HPI:  61 yo male with no known pmhx presenting to the ED with RLQ and R flank pain. Patient's symptoms started 3/15 and was initially seen in the ED for these similar complaints on 3/18. At that time, patient had mild hydro on CT, but no hydro visualized on US. He was discharged with Flomax and urology follow up. He returned 3/22 am, was discharged and returned again 3/22 pm as pain was not improving.  CT yesterday w/ R  uretal stone/ hydronephrosis > OR this am w/ Urology for stent placement  Over these few days, hgb has also been dropping 9.7 -> 9.1 -> 8.5. He denies blood in the stool or urine, denies hematemesis or hemoptysis, He has never had EGD or colonoscopy in the past.> GI consulted antrum/gastric thickening on CT    REVIEW OF SYSTEMS:  Constitutional: No fever, weight loss or fatigue  ENMT:  No difficulty hearing, tinnitus, vertigo; No sinus or throat pain  Respiratory: No cough, wheezing, chills or hemoptysis  Cardiovascular: No chest pain, palpitations, dizziness or leg swelling  Gastrointestinal: No abdominal or epigastric pain. No nausea, vomiting or hematemesis;   No diarrhea or constipation. No melena or hematochezia.  Skin: No itching, burning, rashes or lesions   Musculoskeletal: No joint pain or swelling; No muscle, back or extremity pain    PAST MEDICAL & SURGICAL HISTORY:  No pertinent past medical history      No significant past surgical history          FAMILY HISTORY:  No pertinent family history in first degree relatives        SOCIAL HISTORY:  Smoking Status: [ ] Current, [ ] Former, [x Never  Pack Years:  [  ] EtOH  [  ] IVDA  Employed as Boat  mate, works intermittently  1 grown son resides in [place], pt unmarried    MEDICATIONS:  MEDICATIONS  (STANDING):  acetaminophen     Tablet .. 975 milliGRAM(s) Oral every 6 hours  cefTRIAXone Injectable. 1000 milliGRAM(s) IV Push every 24 hours  cyanocobalamin 1000 MICROGram(s) Oral daily  influenza   Vaccine 0.5 milliLiter(s) IntraMuscular once  ondansetron Injectable 4 milliGRAM(s) IV Push once  pantoprazole    Tablet 40 milliGRAM(s) Oral two times a day  tamsulosin 0.4 milliGRAM(s) Oral at bedtime    MEDICATIONS  (PRN):  aluminum hydroxide/magnesium hydroxide/simethicone Suspension 30 milliLiter(s) Oral every 4 hours PRN Dyspepsia  melatonin 3 milliGRAM(s) Oral at bedtime PRN Insomnia  ondansetron Injectable 4 milliGRAM(s) IV Push every 6 hours PRN Nausea and/or Vomiting      Allergies    No Known Allergies    Intolerances        Vital Signs Last 24 Hrs  T(C): 36.7 (23 Mar 2025 12:00), Max: 36.8 (23 Mar 2025 05:10)  T(F): 98 (23 Mar 2025 12:00), Max: 98.2 (23 Mar 2025 05:10)  HR: 73 (23 Mar 2025 12:00) (59 - 80)  BP: 128/69 (23 Mar 2025 12:00) (114/86 - 135/75)  BP(mean): 91 (23 Mar 2025 11:15) (91 - 103)  RR: 18 (23 Mar 2025 12:00) (16 - 18)  SpO2: 100% (23 Mar 2025 12:00) (99% - 100%)    Parameters below as of 23 Mar 2025 12:00  Patient On (Oxygen Delivery Method): room air        03-23 @ 07:01  -  03-23 @ 12:13  --------------------------------------------------------  IN: 250 mL / OUT: 150 mL / NET: 100 mL          PHYSICAL EXAM:    General: Well developed; well nourished; in no acute distress  HEENT: MMM, conjunctiva and sclera clear  Gastrointestinal: Soft, non-tender non-distended; Normal bowel sounds;   No rebound or guarding  Extremities: Normal range of motion, No clubbing, cyanosis or edema  Neurological: Alert and oriented x3  Skin: Warm and dry. No obvious rash      LABS:                        8.4    5.54  )-----------( 325      ( 23 Mar 2025 06:01 )             29.3           Ferritin: 6 ng/mL (03-23-25 @ 05:42)  Iron Total: 19 ug/dL (03-23-25 @ 05:42)  Iron - Total Binding Capacity.: 409 ug/dL (03-23-25 @ 05:42)        RADIOLOGY & ADDITIONAL STUDIES:       < from: CT Abdomen and Pelvis w/ IV Cont (03.23.25 @ 00:39) >  PROCEDURE:  CT of the Abdomen and Pelvis was performed.  Sagittal and coronal reformats were performed.    FINDINGS:  LOWER CHEST: Mild bibasilar dependent atelectasis. Metallic fragment in   the left lateral chest wall.    LIVER: Mild hepatic steatosis along the falciform ligament.  BILE DUCTS: Normal caliber.  GALLBLADDER: Within normal limits.  SPLEEN: Within normal limits.  PANCREAS: Within normal limits.  ADRENALS: Within normal limits.  KIDNEYS/URETERS: Kidneys enhance symmetrically. Duplicated right renal   collecting system with convergence of the upper pole and lower pole   ureters at the level of the mid ureter. Mild right-sided hydroureter in   comparison to the left secondary to a 5 mm calculus in the distal right   ureter (series 3:112). Mild asymmetric right-sided perinephric stranding.    BLADDER: Within normal limits.  REPRODUCTIVE ORGANS: Prostate is within normal limits.    BOWEL: No bowel obstruction. Appendix is normal. Thickening of the walls   of the gastric antrum and duodenal bulb with focal ulceration/outpouching   along the lesser curvature. Mild surrounding infiltration of the fat.  PERITONEUM/RETROPERITONEUM: No ascites, pneumoperitoneum, or loculated   collection.  VESSELS: Within normal limits.  LYMPH NODES: Mildly enlarged periportal and perigastric lymph nodes;   representative lymph node measuring 1.7 x 0.9 cm (series 3:37).  ABDOMINAL WALL: Small fat-containing umbilical hernia.  BONES: Degenerative changes of the spine.    IMPRESSION:  Duplicated right renal collecting system with convergence of the upper   pole and lower pole ureters at the level of the mid ureter. Mild   right-sided hydroureter in comparison to theleft secondary to a 5 mm   calculus in the distal right ureter (series 3:112). Mild asymmetric   right-sided perinephric stranding.    Thickening of the walls of the gastric antrum and duodenal bulb with   focal ulceration/outpouching along the lesser curvature. Mild surrounding   infiltration of the fat and a few adjacent enlarged lymph nodes.   Considerations include gastritis/duodenitis and peptic ulcer disease or   gastric malignancy with ulceration. Recommend follow-up with   gastroenterology and endoscopy.    < end of copied text >

## 2025-03-23 NOTE — ED ADULT NURSE NOTE - CAS DISCH BELONGINGS RETURNED
proptery sheet fill out and placed in pt's chart prior to transport to inpatient unit. pt confirmed ALL BELONGINGS are accounted for.

## 2025-03-23 NOTE — H&P ADULT - NSHPPHYSICALEXAM_GEN_ALL_CORE
Vital Signs Last 24 Hrs  T(C): 36.6 (23 Mar 2025 03:15), Max: 36.6 (22 Mar 2025 22:07)  T(F): 97.9 (23 Mar 2025 03:15), Max: 97.9 (23 Mar 2025 03:15)  HR: 64 (23 Mar 2025 03:15) (64 - 80)  BP: 134/80 (23 Mar 2025 03:15) (134/77 - 134/80)  BP(mean): 98 (23 Mar 2025 03:15) (98 - 98)  RR: 18 (23 Mar 2025 03:15) (18 - 18)  SpO2: 100% (23 Mar 2025 03:15) (99% - 100%)    Parameters below as of 23 Mar 2025 03:15  Patient On (Oxygen Delivery Method): room air        General: Age-appearing, in no acute distress  Head: Normochephalic, atraumatic  ENMT: EOMI, neck supple  Cardiovascular: +S1, S2; Regular rate and rhythm, no murmurs, rubs, gallops  Respiratory: CTA BL, no wheezes, rales, rhonchi  Gastrointestinal: Abdomen soft, non-tender, +BS in all 4 quadrants  Extremities: No clubbing, cyanosis, or edema  Vascular: 2+ pulses, cap refill < 2 seconds  Neuro: Non-focal, AAOx4, sensation intact BL  Musculoskeletal: Normal tone, no deformities  Skin: Warm, dry; no acute rash seen  Psych: Appropriate, cooperative Vital Signs Last 24 Hrs  T(C): 36.6 (23 Mar 2025 03:15), Max: 36.6 (22 Mar 2025 22:07)  T(F): 97.9 (23 Mar 2025 03:15), Max: 97.9 (23 Mar 2025 03:15)  HR: 64 (23 Mar 2025 03:15) (64 - 80)  BP: 134/80 (23 Mar 2025 03:15) (134/77 - 134/80)  BP(mean): 98 (23 Mar 2025 03:15) (98 - 98)  RR: 18 (23 Mar 2025 03:15) (18 - 18)  SpO2: 100% (23 Mar 2025 03:15) (99% - 100%)    Parameters below as of 23 Mar 2025 03:15  Patient On (Oxygen Delivery Method): room air    General: Age-appearing, in no acute distress  Head: Normocephalic, atraumatic  ENMT: EOMI, neck supple  Cardiovascular: +S1, S2; Regular rate and rhythm, no murmurs, rubs, gallops  Respiratory: CTA BL, no wheezes, rales, rhonchi  Gastrointestinal: Abdomen soft, +RLQ tenderness; +BS in all 4 quadrants  Extremities: No clubbing, cyanosis, or edema  Vascular: 2+ pulses, cap refill < 2 seconds  Neuro: Non-focal, AAOx4, sensation intact BL  Musculoskeletal: Normal tone, no deformities  Skin: Warm, dry; no acute rash seen  Psych: Appropriate, cooperative

## 2025-03-23 NOTE — ED ADULT NURSE NOTE - CHIEF COMPLAINT QUOTE
PT BIBA stated that he was recently d/c from [facility] for kidney stones.  PT stated that he is still having R flank pain, took ibuprofen earlier.

## 2025-03-23 NOTE — PATIENT PROFILE ADULT - NSFALLSECTIONLABEL_GEN_A_CORE
Patient pulled out her IV, stated "theres nothing wrong with me, I don't want another one " Writer explained to patient she is being treated for an infection in her urine and needs another IV placed for treatment  Patient yelled out and stated " theres nothing wrong with me, no I don't want nothing " Patient remains on a one to one for safety  .

## 2025-03-23 NOTE — BRIEF OPERATIVE NOTE - SPECIMENS
Pt Name: Candie Garcia  MRN: [id number]  YOB: 1949  Date of evaluation: 6/30/2022    H&P including current review of systems was updated in the paper chart and/or the document previously scanned into the record. There have been no significant changes or new problems since the original evaluation. The patient's problems continue and indications for contemplated procedure have not changed.     Electronically signed by Moon Ring MD on 6/30/2022 at 8:03 AM none

## 2025-03-23 NOTE — CONSULT NOTE ADULT - ASSESSMENT
Pt is a 59yo male who presents to the ED for a third time with RLQ/right flank pain, vomiting, and decreased appetite On imaging, the pt is found to have a 5mm right distal ureteral stone with associated mild hydroureteronephrosis in addition to thickening of the walls of the gastric antrum and duodenal bulb with focal ulceration/outpouching few adjacent enlarged lymph nodes. Lipase elevated to 166.    - Recommend admit to medicine for GI consult to rule out GI etiology or malignancy   - NPO for possible stent with urology 3/23 AM   - Monitor WBC, creatinine   - Trend lipase   - IVF   - Monitor fever curve   - Pain control with toradol, ofirmev  - Flomax     Plan discussed with attending Dr. Peraza  Pt is a 61yo male who presents to the ED for a third time with RLQ/right flank pain, vomiting, and decreased appetite On imaging, the pt is found to have a 5mm right distal ureteral stone with associated mild hydroureteronephrosis in addition to thickening of the walls of the gastric antrum and duodenal bulb with focal ulceration/outpouching few adjacent enlarged lymph nodes. Lipase elevated to 166.    - Recommend admit to medicine for GI consult to rule out GI etiology or malignancy   - NPO for possible stent with urology 3/23 AM   - Monitor WBC, creatinine   - Trend lipase   - IVF   - Monitor fever curve   - Pain control with toradol, ofirmev  - IV abx ppx for stent   - Flomax     Plan discussed with attending Dr. Peraza  Pt is a 61yo male who presents to the ED for a third time with RLQ/right flank pain, vomiting, and decreased appetite On imaging, the pt is found to have a 5mm right distal ureteral stone with associated mild hydroureteronephrosis in addition to thickening of the walls of the gastric antrum and duodenal bulb with focal ulceration/outpouching few adjacent enlarged lymph nodes measuring up to 1.7cm with anemia noted on labs. Lipase elevated to 166.    - Recommend admit to medicine for GI consult to rule out GI etiology or malignancy   - NPO for possible stent with urology 3/23 AM   - Monitor WBC, creatinine   - Trend lipase   - IVF   - Monitor fever curve   - Pain control with toradol, ofirmev  - IV abx ppx for stent (cefotetan)  - Flomax     Plan discussed with attending Dr. Peraza  Pt is a 61yo male who presents to the ED for a third time with RLQ/right flank pain, vomiting, and decreased appetite. Urology consulted for 4mm right distal ureteral stone. On imaging, the pt is found to have a 5mm right distal ureteral stone with associated mild hydroureteronephrosis. Additionally, on the CT was also pertinent for thickening of the walls of the gastric antrum and duodenal bulb with focal ulceration/outpouching few adjacent enlarged lymph nodes measuring up to 1.7cm with persistent anemia noted on labs dating back to first ED admission on 3/18. Lipase newly elevated to 166.    - Recommend admit to medicine for GI consult to rule out GI etiology or malignancy   - NPO for possible stent with urology 3/23 AM   - Monitor WBC, creatinine   - Trend hgb   - Trend lipase   - IVF   - Monitor fever curve   - Pain control with toradol, ofirmev  - IV abx ppx for stent (cefotetan)  - Flomax     Plan discussed with attending Dr. Peraza

## 2025-03-24 ENCOUNTER — APPOINTMENT (OUTPATIENT)
Dept: UROLOGY | Facility: CLINIC | Age: 60
End: 2025-03-24

## 2025-03-24 ENCOUNTER — RESULT REVIEW (OUTPATIENT)
Age: 60
End: 2025-03-24

## 2025-03-24 ENCOUNTER — TRANSCRIPTION ENCOUNTER (OUTPATIENT)
Age: 60
End: 2025-03-24

## 2025-03-24 LAB
ALBUMIN SERPL ELPH-MCNC: 3.2 G/DL — LOW (ref 3.3–5.2)
ALP SERPL-CCNC: 54 U/L — SIGNIFICANT CHANGE UP (ref 40–120)
ALT FLD-CCNC: <5 U/L — SIGNIFICANT CHANGE UP
ANION GAP SERPL CALC-SCNC: 9 MMOL/L — SIGNIFICANT CHANGE UP (ref 5–17)
APTT BLD: 29.2 SEC — SIGNIFICANT CHANGE UP (ref 24.5–35.6)
AST SERPL-CCNC: 12 U/L — SIGNIFICANT CHANGE UP
BILIRUB SERPL-MCNC: <0.2 MG/DL — LOW (ref 0.4–2)
BUN SERPL-MCNC: 18.9 MG/DL — SIGNIFICANT CHANGE UP (ref 8–20)
CALCIUM SERPL-MCNC: 8.3 MG/DL — LOW (ref 8.4–10.5)
CEA SERPL-MCNC: <0.6 NG/ML — SIGNIFICANT CHANGE UP (ref 0–3.8)
CHLORIDE SERPL-SCNC: 103 MMOL/L — SIGNIFICANT CHANGE UP (ref 96–108)
CO2 SERPL-SCNC: 26 MMOL/L — SIGNIFICANT CHANGE UP (ref 22–29)
CREAT SERPL-MCNC: 0.73 MG/DL — SIGNIFICANT CHANGE UP (ref 0.5–1.3)
CULTURE RESULTS: NO GROWTH — SIGNIFICANT CHANGE UP
EGFR: 105 ML/MIN/1.73M2 — SIGNIFICANT CHANGE UP
EGFR: 105 ML/MIN/1.73M2 — SIGNIFICANT CHANGE UP
GLUCOSE SERPL-MCNC: 97 MG/DL — SIGNIFICANT CHANGE UP (ref 70–99)
HCT VFR BLD CALC: 25.2 % — LOW (ref 39–50)
HCT VFR BLD CALC: 26.8 % — LOW (ref 39–50)
HGB BLD-MCNC: 7.3 G/DL — LOW (ref 13–17)
HGB BLD-MCNC: 8 G/DL — LOW (ref 13–17)
INR BLD: 1.23 RATIO — HIGH (ref 0.85–1.16)
MCHC RBC-ENTMCNC: 18.1 PG — LOW (ref 27–34)
MCHC RBC-ENTMCNC: 19.3 PG — LOW (ref 27–34)
MCHC RBC-ENTMCNC: 29 G/DL — LOW (ref 32–36)
MCHC RBC-ENTMCNC: 29.9 G/DL — LOW (ref 32–36)
MCV RBC AUTO: 62.5 FL — LOW (ref 80–100)
MCV RBC AUTO: 64.6 FL — LOW (ref 80–100)
NRBC # BLD AUTO: 0 K/UL — SIGNIFICANT CHANGE UP (ref 0–0)
NRBC # BLD AUTO: 0 K/UL — SIGNIFICANT CHANGE UP (ref 0–0)
NRBC # FLD: 0 K/UL — SIGNIFICANT CHANGE UP (ref 0–0)
NRBC # FLD: 0 K/UL — SIGNIFICANT CHANGE UP (ref 0–0)
NRBC BLD AUTO-RTO: 0 /100 WBCS — SIGNIFICANT CHANGE UP (ref 0–0)
PLATELET # BLD AUTO: 300 K/UL — SIGNIFICANT CHANGE UP (ref 150–400)
PLATELET # BLD AUTO: 320 K/UL — SIGNIFICANT CHANGE UP (ref 150–400)
PMV BLD: 10.4 FL — SIGNIFICANT CHANGE UP (ref 7–13)
PMV BLD: SIGNIFICANT CHANGE UP FL (ref 7–13)
POTASSIUM SERPL-MCNC: 3.9 MMOL/L — SIGNIFICANT CHANGE UP (ref 3.5–5.3)
POTASSIUM SERPL-SCNC: 3.9 MMOL/L — SIGNIFICANT CHANGE UP (ref 3.5–5.3)
PROT SERPL-MCNC: 5.5 G/DL — LOW (ref 6.6–8.7)
PROTHROM AB SERPL-ACNC: 13.9 SEC — HIGH (ref 9.9–13.4)
RBC # BLD: 4.03 M/UL — LOW (ref 4.2–5.8)
RBC # BLD: 4.15 M/UL — LOW (ref 4.2–5.8)
RBC # FLD: 19.5 % — HIGH (ref 10.3–14.5)
RBC # FLD: 22.1 % — HIGH (ref 10.3–14.5)
SODIUM SERPL-SCNC: 138 MMOL/L — SIGNIFICANT CHANGE UP (ref 135–145)
SPECIMEN SOURCE: SIGNIFICANT CHANGE UP
WBC # BLD: 6.29 K/UL — SIGNIFICANT CHANGE UP (ref 3.8–10.5)
WBC # BLD: 8.59 K/UL — SIGNIFICANT CHANGE UP (ref 3.8–10.5)
WBC # FLD AUTO: 6.29 K/UL — SIGNIFICANT CHANGE UP (ref 3.8–10.5)
WBC # FLD AUTO: 8.59 K/UL — SIGNIFICANT CHANGE UP (ref 3.8–10.5)

## 2025-03-24 PROCEDURE — 88342 IMHCHEM/IMCYTCHM 1ST ANTB: CPT | Mod: 26

## 2025-03-24 PROCEDURE — 43239 EGD BIOPSY SINGLE/MULTIPLE: CPT

## 2025-03-24 PROCEDURE — 99222 1ST HOSP IP/OBS MODERATE 55: CPT

## 2025-03-24 PROCEDURE — 99233 SBSQ HOSP IP/OBS HIGH 50: CPT

## 2025-03-24 PROCEDURE — 88305 TISSUE EXAM BY PATHOLOGIST: CPT | Mod: 26

## 2025-03-24 RX ORDER — IRON SUCROSE 20 MG/ML
200 INJECTION, SOLUTION INTRAVENOUS EVERY 24 HOURS
Refills: 0 | Status: DISCONTINUED | OUTPATIENT
Start: 2025-03-24 | End: 2025-03-25

## 2025-03-24 RX ORDER — ACETAMINOPHEN 500 MG/5ML
650 LIQUID (ML) ORAL EVERY 6 HOURS
Refills: 0 | Status: DISCONTINUED | OUTPATIENT
Start: 2025-03-24 | End: 2025-03-25

## 2025-03-24 RX ORDER — SUCRALFATE 1 G
1 TABLET ORAL
Refills: 0 | Status: DISCONTINUED | OUTPATIENT
Start: 2025-03-24 | End: 2025-03-25

## 2025-03-24 RX ADMIN — Medication 1 GRAM(S): at 12:16

## 2025-03-24 RX ADMIN — CYANOCOBALAMIN 1000 MICROGRAM(S): 1000 INJECTION INTRAMUSCULAR; SUBCUTANEOUS at 12:16

## 2025-03-24 RX ADMIN — Medication 1 GRAM(S): at 21:27

## 2025-03-24 RX ADMIN — Medication 40 MILLIGRAM(S): at 05:39

## 2025-03-24 RX ADMIN — Medication 975 MILLIGRAM(S): at 06:00

## 2025-03-24 RX ADMIN — Medication 3 MILLIGRAM(S): at 21:27

## 2025-03-24 RX ADMIN — Medication 1 GRAM(S): at 17:40

## 2025-03-24 RX ADMIN — Medication 975 MILLIGRAM(S): at 00:01

## 2025-03-24 RX ADMIN — Medication 975 MILLIGRAM(S): at 01:01

## 2025-03-24 RX ADMIN — IRON SUCROSE 110 MILLIGRAM(S): 20 INJECTION, SOLUTION INTRAVENOUS at 12:16

## 2025-03-24 RX ADMIN — CEFTRIAXONE 1000 MILLIGRAM(S): 500 INJECTION, POWDER, FOR SOLUTION INTRAMUSCULAR; INTRAVENOUS at 12:20

## 2025-03-24 RX ADMIN — Medication 40 MILLIGRAM(S): at 17:40

## 2025-03-24 RX ADMIN — Medication 975 MILLIGRAM(S): at 05:40

## 2025-03-24 RX ADMIN — TAMSULOSIN HYDROCHLORIDE 0.4 MILLIGRAM(S): 0.4 CAPSULE ORAL at 21:27

## 2025-03-24 NOTE — PROGRESS NOTE ADULT - SUBJECTIVE AND OBJECTIVE BOX
seen for kidney stone, melena/anemia    bedside  utilized  feels better  had melena this am  ros neg     MEDICATIONS  (STANDING):  cefTRIAXone Injectable. 1000 milliGRAM(s) IV Push every 24 hours  cyanocobalamin 1000 MICROGram(s) Oral daily  influenza   Vaccine 0.5 milliLiter(s) IntraMuscular once  iron sucrose IVPB 200 milliGRAM(s) IV Intermittent every 24 hours  pantoprazole    Tablet 40 milliGRAM(s) Oral two times a day  tamsulosin 0.4 milliGRAM(s) Oral at bedtime    MEDICATIONS  (PRN):  acetaminophen     Tablet .. 650 milliGRAM(s) Oral every 6 hours PRN Temp greater or equal to 38C (100.4F), Mild Pain (1 - 3), Moderate Pain (4 - 6)  aluminum hydroxide/magnesium hydroxide/simethicone Suspension 30 milliLiter(s) Oral every 4 hours PRN Dyspepsia  melatonin 3 milliGRAM(s) Oral at bedtime PRN Insomnia  ondansetron Injectable 4 milliGRAM(s) IV Push every 6 hours PRN Nausea and/or Vomiting      Allergies    No Known Allergies         Vital Signs Last 24 Hrs  T(C): 36.5 (24 Mar 2025 10:39), Max: 37.1 (23 Mar 2025 21:17)  T(F): 97.7 (24 Mar 2025 10:39), Max: 98.7 (23 Mar 2025 21:17)  HR: 68 (24 Mar 2025 10:49) (68 - 87)  BP: 103/62 (24 Mar 2025 10:49) (96/59 - 128/69)  BP(mean): 77 (24 Mar 2025 05:36) (77 - 98)  RR: 15 (24 Mar 2025 10:49) (15 - 22)  SpO2: 100% (24 Mar 2025 10:49) (96% - 100%)    Parameters below as of 24 Mar 2025 10:44  Patient On (Oxygen Delivery Method): room air        PHYSICAL EXAM:    GENERAL: NAD   CHEST/LUNG: Clear to ausculation bilaterally;   HEART: Regular rate and rhythm; S1 S2  ABDOMEN: Soft, Nontender, Nondistended; Bowel sounds present  EXTREMITIES: no edema   NERVOUS SYSTEM:  Alert & Oriented X3, Motor Strength 5/5 B/L upper and lower extremities  PSYCH: normal mood, appropriate response.    LABS:                        7.3    8.59  )-----------( 320      ( 24 Mar 2025 05:01 )             25.2     03-24    138  |  103  |  18.9  ----------------------------<  97  3.9   |  26.0  |  0.73    Ca    8.3[L]      24 Mar 2025 05:01    TPro  5.5[L]  /  Alb  3.2[L]  /  TBili  <0.2[L]  /  DBili  x   /  AST  12  /  ALT  <5  /  AlkPhos  54  03-24    PT/INR - ( 24 Mar 2025 05:01 )   PT: 13.9 sec;   INR: 1.23 ratio         PTT - ( 24 Mar 2025 05:01 )  PTT:29.2 sec  Urinalysis Basic - ( 24 Mar 2025 05:01 )    Color: x / Appearance: x / SG: x / pH: x  Gluc: 97 mg/dL / Ketone: x  / Bili: x / Urobili: x   Blood: x / Protein: x / Nitrite: x   Leuk Esterase: x / RBC: x / WBC x   Sq Epi: x / Non Sq Epi: x / Bacteria: x        CAPILLARY BLOOD GLUCOSE            RADIOLOGY & ADDITIONAL TESTS:

## 2025-03-24 NOTE — PROGRESS NOTE ADULT - ASSESSMENT
59 yo male with no known pmhx presenting to the ED with RLQ and R flank pain. CT showing mild hydronephrosis with 5 mm renal stone. Additional findings include thickening of the antrum and duodenal bulb with focal ulceration/outpouching along the lesser curvature with enlarged lymph nodes.    Right Ureteral Stone with Mild Hydronephrosis   -No signs of infection, afebrile, no leukocytosis   -Seen by urology, s/p cystoscopy and stent placement   -Continue Flomax  -finish course of rocephin   -Pain control    Gastric Thickening with Surrounding Lymph Nodes  severe iron deficiency anemia   -CT showing thickening of the antrum and duodenal bulb with focal ulceration/outpouching along the lesser curvature with enlarged lymph nodes; consider gastritis/duodenitis and peptic ulcer disease or gastric malignancy  -Patient has not had colo/EGD in the past  -GI following  egd today  PPI   venofer  1 U PRBC by GI       VTEppx: SCDs  Dispo: once cleared by GI

## 2025-03-24 NOTE — PROGRESS NOTE ADULT - ASSESSMENT
60M with 5mm r ureteral stone now s/p cystoscopy and stent. Pt admitted with unknown source of anemia, hgb down to 7.3 today. Lipase continues to trend upward to 445 this AM.     - Plan for EGD and biopsies with GI  - Awaiting results of urine culture  - Continue abx  - Pain control  - OOB as tolerated  - Encourage IS  - DVT ppx  - Rest of care per primary 60M with 5mm r ureteral stone now s/p cystoscopy and stent. Pt admitted with unknown source of anemia, hgb down to 7.3 today. Lipase continues to trend upward to 445 this AM.     - Plan for EGD and biopsies with GI  - Awaiting results of urine culture  - Continue abx  - Pain control  - OOB as tolerated & encourage IS  - Trend Hgb   - DVT ppx  - Rest of care per primary 60M with 5mm r ureteral stone now s/p cystoscopy and stent. Pt admitted with unknown source of anemia, hgb down to 7.3 today. Lipase continues to trend upward to 445 this AM.     - Plan for EGD and biopsies with GI  - Continue abx  - Pain control  - OOB as tolerated & encourage IS  - Trend Hgb   - DVT ppx  - No further urological intervention at this time   - Rest of care per primary    Please have pt follow up with Dr. Moon Peraza in 2 weeks for stent removal and lithotripsy. Office number [phone]

## 2025-03-24 NOTE — PROGRESS NOTE ADULT - SUBJECTIVE AND OBJECTIVE BOX
SUBJECTIVE / 24H EVENTS:    Pt seen and examined at bedside by the team during rounds. Pt found to be resting in bed comfortably with no new acute complaints at the time of examination. Pt denies CP, SOB, N/V, fever, chills.     MEDICATIONS  (STANDING):  acetaminophen     Tablet .. 975 milliGRAM(s) Oral every 6 hours  cefTRIAXone Injectable. 1000 milliGRAM(s) IV Push every 24 hours  cyanocobalamin 1000 MICROGram(s) Oral daily  influenza   Vaccine 0.5 milliLiter(s) IntraMuscular once  ondansetron Injectable 4 milliGRAM(s) IV Push once  pantoprazole    Tablet 40 milliGRAM(s) Oral two times a day  tamsulosin 0.4 milliGRAM(s) Oral at bedtime    MEDICATIONS  (PRN):  aluminum hydroxide/magnesium hydroxide/simethicone Suspension 30 milliLiter(s) Oral every 4 hours PRN Dyspepsia  melatonin 3 milliGRAM(s) Oral at bedtime PRN Insomnia  ondansetron Injectable 4 milliGRAM(s) IV Push every 6 hours PRN Nausea and/or Vomiting      Vital Signs Last 24 Hrs  T(C): 36.9 (24 Mar 2025 05:36), Max: 37.1 (23 Mar 2025 21:17)  T(F): 98.4 (24 Mar 2025 05:36), Max: 98.7 (23 Mar 2025 21:17)  HR: 73 (24 Mar 2025 05:36) (62 - 77)  BP: 105/62 (24 Mar 2025 05:36) (105/62 - 133/88)  BP(mean): 77 (24 Mar 2025 05:36) (77 - 103)  RR: 18 (24 Mar 2025 05:36) (16 - 18)  SpO2: 96% (24 Mar 2025 05:36) (96% - 100%)    Parameters below as of 24 Mar 2025 05:36  Patient On (Oxygen Delivery Method): room air      Physical Exam  Constitutional: patient appears comfortable resting in bed, in no apparent distress  Respiratory: respirations are unlabored, no accessory muscle use, no conversational dyspnea  Cardiovascular: regular rate & rhythm  Gastrointestinal: abdomen is soft & non-distended, non-tender, no rebound tenderness / guarding  Musculoskeletal: MARSH x 4 spontaneously, extremities are without point tenderness or deformity  Skin: mucous membranes moist, no diaphoresis, pallor, cyanosis or jaundice      I&O's Detail    23 Mar 2025 07:01  -  24 Mar 2025 06:39  --------------------------------------------------------  IN:    Oral Fluid: 580 mL  Total IN: 580 mL    OUT:    Voided (mL): 750 mL  Total OUT: 750 mL    Total NET: -170 mL          LABS:                        7.3    8.59  )-----------( 320      ( 24 Mar 2025 05:01 )             25.2     03-24    138  |  103  |  18.9  ----------------------------<  97  3.9   |  26.0  |  0.73    Ca    8.3[L]      24 Mar 2025 05:01    TPro  5.5[L]  /  Alb  3.2[L]  /  TBili  <0.2[L]  /  DBili  x   /  AST  12  /  ALT  <5  /  AlkPhos  54  03-24    PT/INR - ( 24 Mar 2025 05:01 )   PT: 13.9 sec;   INR: 1.23 ratio         PTT - ( 24 Mar 2025 05:01 )  PTT:29.2 sec  Urinalysis Basic - ( 24 Mar 2025 05:01 )    Color: x / Appearance: x / SG: x / pH: x  Gluc: 97 mg/dL / Ketone: x  / Bili: x / Urobili: x   Blood: x / Protein: x / Nitrite: x   Leuk Esterase: x / RBC: x / WBC x   Sq Epi: x / Non Sq Epi: x / Bacteria: x       SUBJECTIVE / 24H EVENTS:    Pt seen and examined at bedside by the team during rounds. Pt found to be resting in bed comfortably with no new acute complaints at the time of examination. Pt endorses bloody BMs. Pt denies CP, SOB, N/V, fever, chills.     MEDICATIONS  (STANDING):  acetaminophen     Tablet .. 975 milliGRAM(s) Oral every 6 hours  cefTRIAXone Injectable. 1000 milliGRAM(s) IV Push every 24 hours  cyanocobalamin 1000 MICROGram(s) Oral daily  influenza   Vaccine 0.5 milliLiter(s) IntraMuscular once  ondansetron Injectable 4 milliGRAM(s) IV Push once  pantoprazole    Tablet 40 milliGRAM(s) Oral two times a day  tamsulosin 0.4 milliGRAM(s) Oral at bedtime    MEDICATIONS  (PRN):  aluminum hydroxide/magnesium hydroxide/simethicone Suspension 30 milliLiter(s) Oral every 4 hours PRN Dyspepsia  melatonin 3 milliGRAM(s) Oral at bedtime PRN Insomnia  ondansetron Injectable 4 milliGRAM(s) IV Push every 6 hours PRN Nausea and/or Vomiting      Vital Signs Last 24 Hrs  T(C): 36.9 (24 Mar 2025 05:36), Max: 37.1 (23 Mar 2025 21:17)  T(F): 98.4 (24 Mar 2025 05:36), Max: 98.7 (23 Mar 2025 21:17)  HR: 73 (24 Mar 2025 05:36) (62 - 77)  BP: 105/62 (24 Mar 2025 05:36) (105/62 - 133/88)  BP(mean): 77 (24 Mar 2025 05:36) (77 - 103)  RR: 18 (24 Mar 2025 05:36) (16 - 18)  SpO2: 96% (24 Mar 2025 05:36) (96% - 100%)    Parameters below as of 24 Mar 2025 05:36  Patient On (Oxygen Delivery Method): room air      Physical Exam  Constitutional: patient appears comfortable resting in bed, in no apparent distress  Respiratory: respirations are unlabored, no accessory muscle use, no conversational dyspnea  Cardiovascular: regular rate & rhythm  Gastrointestinal: abdomen is soft & non-distended, non-tender, no rebound tenderness / guarding  Musculoskeletal: MARSH x 4 spontaneously, extremities are without point tenderness or deformity  Skin: mucous membranes moist, no diaphoresis, pallor, cyanosis or jaundice      I&O's Detail    23 Mar 2025 07:01  -  24 Mar 2025 06:39  --------------------------------------------------------  IN:    Oral Fluid: 580 mL  Total IN: 580 mL    OUT:    Voided (mL): 750 mL  Total OUT: 750 mL    Total NET: -170 mL          LABS:                        7.3    8.59  )-----------( 320      ( 24 Mar 2025 05:01 )             25.2     03-24    138  |  103  |  18.9  ----------------------------<  97  3.9   |  26.0  |  0.73    Ca    8.3[L]      24 Mar 2025 05:01    TPro  5.5[L]  /  Alb  3.2[L]  /  TBili  <0.2[L]  /  DBili  x   /  AST  12  /  ALT  <5  /  AlkPhos  54  03-24    PT/INR - ( 24 Mar 2025 05:01 )   PT: 13.9 sec;   INR: 1.23 ratio         PTT - ( 24 Mar 2025 05:01 )  PTT:29.2 sec  Urinalysis Basic - ( 24 Mar 2025 05:01 )    Color: x / Appearance: x / SG: x / pH: x  Gluc: 97 mg/dL / Ketone: x  / Bili: x / Urobili: x   Blood: x / Protein: x / Nitrite: x   Leuk Esterase: x / RBC: x / WBC x   Sq Epi: x / Non Sq Epi: x / Bacteria: x

## 2025-03-25 ENCOUNTER — TRANSCRIPTION ENCOUNTER (OUTPATIENT)
Age: 60
End: 2025-03-25

## 2025-03-25 VITALS — WEIGHT: 162.04 LBS

## 2025-03-25 PROBLEM — Z78.9 OTHER SPECIFIED HEALTH STATUS: Chronic | Status: ACTIVE | Noted: 2025-03-23

## 2025-03-25 LAB
ANION GAP SERPL CALC-SCNC: 11 MMOL/L — SIGNIFICANT CHANGE UP (ref 5–17)
BUN SERPL-MCNC: 12.6 MG/DL — SIGNIFICANT CHANGE UP (ref 8–20)
CALCIUM SERPL-MCNC: 8.3 MG/DL — LOW (ref 8.4–10.5)
CHLORIDE SERPL-SCNC: 104 MMOL/L — SIGNIFICANT CHANGE UP (ref 96–108)
CO2 SERPL-SCNC: 28 MMOL/L — SIGNIFICANT CHANGE UP (ref 22–29)
CREAT SERPL-MCNC: 0.83 MG/DL — SIGNIFICANT CHANGE UP (ref 0.5–1.3)
EGFR: 101 ML/MIN/1.73M2 — SIGNIFICANT CHANGE UP
EGFR: 101 ML/MIN/1.73M2 — SIGNIFICANT CHANGE UP
GLUCOSE SERPL-MCNC: 88 MG/DL — SIGNIFICANT CHANGE UP (ref 70–99)
HCT VFR BLD CALC: 28.1 % — LOW (ref 39–50)
HGB BLD-MCNC: 8.4 G/DL — LOW (ref 13–17)
MAGNESIUM SERPL-MCNC: 2.2 MG/DL — SIGNIFICANT CHANGE UP (ref 1.6–2.6)
MCHC RBC-ENTMCNC: 19.3 PG — LOW (ref 27–34)
MCHC RBC-ENTMCNC: 29.9 G/DL — LOW (ref 32–36)
MCV RBC AUTO: 64.4 FL — LOW (ref 80–100)
NRBC # BLD AUTO: 0 K/UL — SIGNIFICANT CHANGE UP (ref 0–0)
NRBC # FLD: 0 K/UL — SIGNIFICANT CHANGE UP (ref 0–0)
NRBC BLD AUTO-RTO: 0 /100 WBCS — SIGNIFICANT CHANGE UP (ref 0–0)
PHOSPHATE SERPL-MCNC: 4.3 MG/DL — SIGNIFICANT CHANGE UP (ref 2.4–4.7)
PLATELET # BLD AUTO: 301 K/UL — SIGNIFICANT CHANGE UP (ref 150–400)
PMV BLD: 10.3 FL — SIGNIFICANT CHANGE UP (ref 7–13)
POTASSIUM SERPL-MCNC: 3.6 MMOL/L — SIGNIFICANT CHANGE UP (ref 3.5–5.3)
POTASSIUM SERPL-SCNC: 3.6 MMOL/L — SIGNIFICANT CHANGE UP (ref 3.5–5.3)
RBC # BLD: 4.36 M/UL — SIGNIFICANT CHANGE UP (ref 4.2–5.8)
RBC # FLD: 22 % — HIGH (ref 10.3–14.5)
SODIUM SERPL-SCNC: 143 MMOL/L — SIGNIFICANT CHANGE UP (ref 135–145)
WBC # BLD: 5.07 K/UL — SIGNIFICANT CHANGE UP (ref 3.8–10.5)
WBC # FLD AUTO: 5.07 K/UL — SIGNIFICANT CHANGE UP (ref 3.8–10.5)

## 2025-03-25 PROCEDURE — 84100 ASSAY OF PHOSPHORUS: CPT

## 2025-03-25 PROCEDURE — 81003 URINALYSIS AUTO W/O SCOPE: CPT

## 2025-03-25 PROCEDURE — 85610 PROTHROMBIN TIME: CPT

## 2025-03-25 PROCEDURE — 80048 BASIC METABOLIC PNL TOTAL CA: CPT

## 2025-03-25 PROCEDURE — 87086 URINE CULTURE/COLONY COUNT: CPT

## 2025-03-25 PROCEDURE — 86923 COMPATIBILITY TEST ELECTRIC: CPT

## 2025-03-25 PROCEDURE — 88305 TISSUE EXAM BY PATHOLOGIST: CPT

## 2025-03-25 PROCEDURE — 83615 LACTATE (LD) (LDH) ENZYME: CPT

## 2025-03-25 PROCEDURE — 86901 BLOOD TYPING SEROLOGIC RH(D): CPT

## 2025-03-25 PROCEDURE — 84466 ASSAY OF TRANSFERRIN: CPT

## 2025-03-25 PROCEDURE — 83540 ASSAY OF IRON: CPT

## 2025-03-25 PROCEDURE — 88342 IMHCHEM/IMCYTCHM 1ST ANTB: CPT

## 2025-03-25 PROCEDURE — C9399: CPT

## 2025-03-25 PROCEDURE — 83010 ASSAY OF HAPTOGLOBIN QUANT: CPT

## 2025-03-25 PROCEDURE — C1769: CPT

## 2025-03-25 PROCEDURE — 82746 ASSAY OF FOLIC ACID SERUM: CPT

## 2025-03-25 PROCEDURE — 99285 EMERGENCY DEPT VISIT HI MDM: CPT | Mod: 25

## 2025-03-25 PROCEDURE — 82378 CARCINOEMBRYONIC ANTIGEN: CPT

## 2025-03-25 PROCEDURE — 85025 COMPLETE CBC W/AUTO DIFF WBC: CPT

## 2025-03-25 PROCEDURE — 85730 THROMBOPLASTIN TIME PARTIAL: CPT

## 2025-03-25 PROCEDURE — 86900 BLOOD TYPING SEROLOGIC ABO: CPT

## 2025-03-25 PROCEDURE — T1013: CPT

## 2025-03-25 PROCEDURE — 99239 HOSP IP/OBS DSCHRG MGMT >30: CPT

## 2025-03-25 PROCEDURE — 36430 TRANSFUSION BLD/BLD COMPNT: CPT

## 2025-03-25 PROCEDURE — 76000 FLUOROSCOPY <1 HR PHYS/QHP: CPT

## 2025-03-25 PROCEDURE — 83550 IRON BINDING TEST: CPT

## 2025-03-25 PROCEDURE — 96374 THER/PROPH/DIAG INJ IV PUSH: CPT

## 2025-03-25 PROCEDURE — 83735 ASSAY OF MAGNESIUM: CPT

## 2025-03-25 PROCEDURE — 86850 RBC ANTIBODY SCREEN: CPT

## 2025-03-25 PROCEDURE — 80053 COMPREHEN METABOLIC PANEL: CPT

## 2025-03-25 PROCEDURE — 74177 CT ABD & PELVIS W/CONTRAST: CPT | Mod: MC

## 2025-03-25 PROCEDURE — 96375 TX/PRO/DX INJ NEW DRUG ADDON: CPT

## 2025-03-25 PROCEDURE — 85027 COMPLETE CBC AUTOMATED: CPT

## 2025-03-25 PROCEDURE — 82728 ASSAY OF FERRITIN: CPT

## 2025-03-25 PROCEDURE — 93005 ELECTROCARDIOGRAM TRACING: CPT

## 2025-03-25 PROCEDURE — 82607 VITAMIN B-12: CPT

## 2025-03-25 PROCEDURE — 36415 COLL VENOUS BLD VENIPUNCTURE: CPT

## 2025-03-25 PROCEDURE — C1758: CPT

## 2025-03-25 PROCEDURE — P9016: CPT

## 2025-03-25 PROCEDURE — C2617: CPT

## 2025-03-25 PROCEDURE — 83690 ASSAY OF LIPASE: CPT

## 2025-03-25 RX ORDER — SUCRALFATE 1 G
1 TABLET ORAL
Qty: 120 | Refills: 0
Start: 2025-03-25 | End: 2025-04-23

## 2025-03-25 RX ORDER — TAMSULOSIN HYDROCHLORIDE 0.4 MG/1
1 CAPSULE ORAL
Qty: 30 | Refills: 0
Start: 2025-03-25 | End: 2025-04-23

## 2025-03-25 RX ORDER — FERROUS SULFATE 137(45) MG
1 TABLET, EXTENDED RELEASE ORAL
Qty: 60 | Refills: 0
Start: 2025-03-25 | End: 2025-04-23

## 2025-03-25 RX ADMIN — Medication 1 GRAM(S): at 05:41

## 2025-03-25 RX ADMIN — CYANOCOBALAMIN 1000 MICROGRAM(S): 1000 INJECTION INTRAMUSCULAR; SUBCUTANEOUS at 11:05

## 2025-03-25 RX ADMIN — CEFTRIAXONE 1000 MILLIGRAM(S): 500 INJECTION, POWDER, FOR SOLUTION INTRAMUSCULAR; INTRAVENOUS at 11:06

## 2025-03-25 RX ADMIN — Medication 40 MILLIGRAM(S): at 05:36

## 2025-03-25 RX ADMIN — Medication 1 GRAM(S): at 11:05

## 2025-03-25 RX ADMIN — IRON SUCROSE 110 MILLIGRAM(S): 20 INJECTION, SOLUTION INTRAVENOUS at 11:07

## 2025-03-25 NOTE — PROGRESS NOTE ADULT - SUBJECTIVE AND OBJECTIVE BOX
Chief Complaint: This is a 60y old man patient being seen in follow-up consultation for     Interval HPI / 24H events:  Patient seen and examined at the bedside, reports no complaints.   This morning hemoglobin   No leucocytosis. Remians afebrile. Tolerating diet     Review of symptoms:   Constitutional: Denies fever, chills  Respiratory: Denies cough, shortness of breath  Cardiovascular: No chest pain, palpitation, TORIBIO  Gastrointestinal: See HPI  Genitourinary: No hematuria  Neurological: Nogative  Skin: Nogative  Musculoskeletal: Negative  Psychiatric: Neagtive      PAST MEDICAL/SURGICAL HISTORY:  No pertinent past medical history    No significant past surgical history      MEDICATIONS  (STANDING):  cyanocobalamin 1000 MICROGram(s) Oral daily  influenza   Vaccine 0.5 milliLiter(s) IntraMuscular once  iron sucrose IVPB 200 milliGRAM(s) IV Intermittent every 24 hours  pantoprazole  Injectable 40 milliGRAM(s) IV Push two times a day  sucralfate suspension 1 Gram(s) Oral four times a day  tamsulosin 0.4 milliGRAM(s) Oral at bedtime    MEDICATIONS  (PRN):  acetaminophen     Tablet .. 650 milliGRAM(s) Oral every 6 hours PRN Temp greater or equal to 38C (100.4F), Mild Pain (1 - 3), Moderate Pain (4 - 6)  aluminum hydroxide/magnesium hydroxide/simethicone Suspension 30 milliLiter(s) Oral every 4 hours PRN Dyspepsia  melatonin 3 milliGRAM(s) Oral at bedtime PRN Insomnia  ondansetron Injectable 4 milliGRAM(s) IV Push every 6 hours PRN Nausea and/or Vomiting    No Known Allergies    T(C): 36.6 (25 @ 08:25), Max: 36.8 (25 @ 14:14)  HR: 66 (25 @ 08:25) (63 - 81)  BP: 101/60 (25 @ 08:25) (100/64 - 113/65)  RR: 18 (25 @ 08:25) (16 - 18)  SpO2: 97% (25 @ 08:25) (96% - 100%)    I&O's Summary      PHYSICAL EXAM:    Constitutional: No acute distress  HEENT: anicteric sclera   Respiratory: No increased effort  Cardiovascular: S1S2, regular rate  Gastrointestinal: Soft, nontender, +bowel sounds. No rigidity, guarding  Rectal: MARIELA shows  Extremities: No edema  Neurological: Alert, oriented  Skin:No jaundice  Psychiatric: Calm, co opertive       LABS:               8.4    5.07  )-----------( 301      (  @ 05:11 )             28.1                8.0    6.29  )-----------( 300      (  @ 16:40 )             26.8                7.3    8.59  )-----------( 320      (  @ 05:01 )             25.2                8.4    5.54  )-----------( 325      (  @ 06:01 )             29.3                8.5    7.23  )-----------( 328      (  @ 00:13 )             29.8           143  |  104  |  12.6  ----------------------------<  88  3.6   |  28.0  |  0.83    Ca    8.3[L]      25 Mar 2025 05:11  Phos  4.3       Mg     2.2         TPro  5.5[L]  /  Alb  3.2[L]  /  TBili  <0.2[L]  /  DBili  x   /  AST  12  /  ALT  <5  /  AlkPhos  54  24    LIVER FUNCTIONS - ( 24 Mar 2025 05:01 )  Alb: 3.2 g/dL / Pro: 5.5 g/dL / ALK PHOS: 54 U/L / ALT: <5 U/L / AST: 12 U/L / GGT: x             Lipase: 445 U/L (25 @ 06:01)  Lipase: 166 U/L (25 @ 00:13)    PT/INR - ( 24 Mar 2025 05:01 )   PT: 13.9 sec;   INR: 1.23 ratio         PTT - ( 24 Mar 2025 05:01 )  PTT:29.2 sec  Ferritin: 6 ng/mL *L* (25 @ 05:42)  Iron Total: 19 ug/dL *L* (25 @ 05:42)  Iron - Total Binding Capacity.: 409 ug/dL (25 @ 05:42)  % Saturation, Iron: 5 % *L* (25 @ 05:42)      Culture - Urine (collected 23 Mar 2025 04:50)  Source: Clean Catch Clean Catch (Midstream)  Final Report (24 Mar 2025 07:08):    No growth      EGD Report  Date: 3/24/2025 9:55 AM  Patient Name: SNOW LARSON  MRN: [id number]  Account Number:  [id number]  Gender: Male   (age): 1965 (60)  Instrument(s):  [id number] [id number]  Attending/Fellow:  KATHE RETANA MD  ASA Class:  P2 - 3/24/2025 10:37 AM KATHE RETANA MD  History of Present Illness:  Abdominal pain, dark stools, anemia  Administered Medications:  As per Anesthesiology Record  Indications:  Dark stools - R19.5  Anemia: 285.9 - D64.9  Abnormal CT scan, stomach - R93.3  Procedure:  The procedure, indications, preparation and potential complications were  explained to the patient, who indicated understanding and signed the  corresponding consent forms. MAC was administered by anesthesiologist.  Continuous pulse oximetry and blood pressure monitoring were used throughout the  procedure. Supplemental oxygen was used. Patient was placed in the left lateral  decubitus position. The endoscope was introduced through the mouth and advanced  under direct visualization until the second part of the duodenum was reached.  Patient tolerance to the procedure was good. The procedure was not difficult.  Blood loss was minimal.  Limitations/Complications:  There were no apparent limitations or complications  Findings:  Esophagus Lumen A Schatzki's ring was found in the esophagus. Non-obstructing.  A small size hiatal hernia was seen.  Stomach Excavated lesions A single non-bleeding 12 mm ulcer was found in the  antrum. Biopsies obtained from ulcer edge.Multiple cold forceps biopsies were  performed for histology.  Duodenum Excavated lesions A single cratered 2.5 cm ulcer was found in the  duodenal bulb. A clot suggested recent bleeding.  Impressions:  Esophageal ring.  Esophageal hiatal hernia.  Ulcer in the antrum. (Biopsy).  Ulcer in the duodenal bulb.  Plan:  Clear liquid diet for now. Give 1u pRBC due to Hgb 7.3 and soft BP. IV PPI BID.  No NSAIDs. Trend Hgb daily. Await pathology. Will need repeat EGD in 8 wks to  assess for healing.  Specimens:  Jar # 1 :  Biopsy in the random gastric  Test(s) requested: Histology  Jar # 2 :  Biopsy in the antral ulcer  Test(s) requested: Histology  Pathology:  Pathology was sent to lab, waiting for results  KATHE RETANA MD  Version 1, Electronically signed on 3/24/2025 10:48:17 AM by KATHE RETANA MD           Chief Complaint: This is a 60y old man patient being seen in follow-up consultation for anemia, radiology finding of gastric antrum thickening.    Interval HPI / 24H events:  Patient seen and examined at the bedside, reports no complaints. S/p EGD yesterday showed gastric ulcer and a large duodenal ulcer measuring 2.5 cm at the duodenal bulb. showed esophageal ring, esophageal hiatal hernia, ulcer in the antrum. Hemoglobin remains stable, this morning 8.4 gm. Denies abdominal pain, nausea, vomiting, melena, hematochezia, hematemesis. No BM post EGD. Denies chest pain, palpitation, dizziness.    ID # [id number] used     Review of symptoms:   Constitutional: Denies fever, chills  Respiratory: Denies cough, shortness of breath  Cardiovascular: No chest pain, palpitation, TORIBIO  Gastrointestinal: See HPI  Genitourinary: No hematuria  Neurological: Negative  Skin: Negative  Musculoskeletal: Negative  Psychiatric: Negative      PAST MEDICAL/SURGICAL HISTORY:  No pertinent past medical history    No significant past surgical history      MEDICATIONS  (STANDING):  cyanocobalamin 1000 MICROGram(s) Oral daily  influenza   Vaccine 0.5 milliLiter(s) IntraMuscular once  iron sucrose IVPB 200 milliGRAM(s) IV Intermittent every 24 hours  pantoprazole  Injectable 40 milliGRAM(s) IV Push two times a day  sucralfate suspension 1 Gram(s) Oral four times a day  tamsulosin 0.4 milliGRAM(s) Oral at bedtime    MEDICATIONS  (PRN):  acetaminophen     Tablet .. 650 milliGRAM(s) Oral every 6 hours PRN Temp greater or equal to 38C (100.4F), Mild Pain (1 - 3), Moderate Pain (4 - 6)  aluminum hydroxide/magnesium hydroxide/simethicone Suspension 30 milliLiter(s) Oral every 4 hours PRN Dyspepsia  melatonin 3 milliGRAM(s) Oral at bedtime PRN Insomnia  ondansetron Injectable 4 milliGRAM(s) IV Push every 6 hours PRN Nausea and/or Vomiting    No Known Allergies    T(C): 36.6 (25 @ 08:25), Max: 36.8 (25 @ 14:14)  HR: 66 (25 @ 08:25) (63 - 81)  BP: 101/60 (25 @ 08:25) (100/64 - 113/65)  RR: 18 (25 @ 08:25) (16 - 18)  SpO2: 97% (25 @ 08:25) (96% - 100%)      PHYSICAL EXAM:    Constitutional: No acute distress  HEENT: anicteric sclera   Respiratory: No increased effort  Cardiovascular: S1S2, regular rate  Gastrointestinal: Soft, nontender, +bowel sounds. No rigidity, guarding  Extremities: No edema  Neurological: Alert, oriented  Skin: No jaundice  Psychiatric: Calm, co operative       LABS:               8.4    5.07  )-----------( 301      (  @ 05:11 )             28.1                8.0    6.29  )-----------( 300      (  @ 16:40 )             26.8                7.3    8.59  )-----------( 320      (  @ 05:01 )             25.2                8.4    5.54  )-----------( 325      (  @ 06:01 )             29.3                8.5    7.23  )-----------( 328      (  @ 00:13 )             29.8           143  |  104  |  12.6  ----------------------------<  88  3.6   |  28.0  |  0.83    Ca    8.3[L]      25 Mar 2025 05:11  Phos  4.3       Mg     2.2         TPro  5.5[L]  /  Alb  3.2[L]  /  TBili  <0.2[L]  /  DBili  x   /  AST  12  /  ALT  <5  /  AlkPhos  54      LIVER FUNCTIONS - ( 24 Mar 2025 05:01 )  Alb: 3.2 g/dL / Pro: 5.5 g/dL / ALK PHOS: 54 U/L / ALT: <5 U/L / AST: 12 U/L / GGT: x             Lipase: 445 U/L (25 @ 06:01)  Lipase: 166 U/L (25 @ 00:13)    PT/INR - ( 24 Mar 2025 05:01 )   PT: 13.9 sec;   INR: 1.23 ratio         PTT - ( 24 Mar 2025 05:01 )  PTT:29.2 sec  Ferritin: 6 ng/mL *L* (25 @ 05:42)  Iron Total: 19 ug/dL *L* (25 @ 05:42)  Iron - Total Binding Capacity.: 409 ug/dL (25 @ 05:42)  % Saturation, Iron: 5 % *L* (25 @ 05:42)      Culture - Urine (collected 23 Mar 2025 04:50)  Source: Clean Catch Clean Catch (Midstream)  Final Report (24 Mar 2025 07:08):    No growth      EGD Report  Date: 3/24/2025 9:55 AM  Patient Name: SNOW LARSON  MRN: [id number]  Account Number:  [id number]  Gender: Male   (age): 1965 (60)  Instrument(s):  GIF [id number]  Attending/Fellow:  KATHE RETANA MD  ASA Class:  P2 - 3/24/2025 10:37 AM KATHE RETANA MD  History of Present Illness:  Abdominal pain, dark stools, anemia  Administered Medications:  As per Anesthesiology Record  Indications:  Dark stools - R19.5  Anemia: 285.9 - D64.9  Abnormal CT scan, stomach - R93.3  Procedure:  The procedure, indications, preparation and potential complications were  explained to the patient, who indicated understanding and signed the  corresponding consent forms. MAC was administered by anesthesiologist.  Continuous pulse oximetry and blood pressure monitoring were used throughout the  procedure. Supplemental oxygen was used. Patient was placed in the left lateral  decubitus position. The endoscope was introduced through the mouth and advanced  under direct visualization until the second part of the duodenum was reached.  Patient tolerance to the procedure was good. The procedure was not difficult.  Blood loss was minimal.  Limitations/Complications:  There were no apparent limitations or complications  Findings:  Esophagus Lumen A Schatzki's ring was found in the esophagus. Non-obstructing.  A small size hiatal hernia was seen.  Stomach Excavated lesions A single non-bleeding 12 mm ulcer was found in the  antrum. Biopsies obtained from ulcer edge.Multiple cold forceps biopsies were  performed for histology.  Duodenum Excavated lesions A single cratered 2.5 cm ulcer was found in the  duodenal bulb. A clot suggested recent bleeding.  Impressions:  Esophageal ring.  Esophageal hiatal hernia.  Ulcer in the antrum. (Biopsy).  Ulcer in the duodenal bulb.  Plan:  Clear liquid diet for now. Give 1u pRBC due to Hgb 7.3 and soft BP. IV PPI BID.  No NSAIDs. Trend Hgb daily. Await pathology. Will need repeat EGD in 8 wks to  assess for healing.  Specimens:  Jar # 1 :  Biopsy in the random gastric  Test(s) requested: Histology  Jar # 2 :  Biopsy in the antral ulcer  Test(s) requested: Histology  Pathology:  Pathology was sent to lab, waiting for results  KATHE RETANA MD  Version 1, Electronically signed on 3/24/2025 10:48:17 AM by KATHE RETANA MD

## 2025-03-25 NOTE — DIETITIAN INITIAL EVALUATION ADULT - ORAL INTAKE PTA/DIET HISTORY
consult received for MST Score = />2. attempted to see pt this afternoon, at EGD at time of attempted visit. Unable to obtain nutrition interview/NFPE, will obtain on follow up assessment. Pt on CLD diet at this time. Last BM documented 3/23. RD to remain available.

## 2025-03-25 NOTE — DISCHARGE NOTE PROVIDER - NSDCFUADDAPPT_GEN_ALL_CORE_FT
APPTS ARE READY TO BE MADE: [X] YES    1: PCP in 2 weeks.  2: Urology in 2 weeks.  3: GI in 4 weeks.

## 2025-03-25 NOTE — DISCHARGE NOTE PROVIDER - CARE PROVIDER_API CALL
Moon Peraza  Urology  [address], [address]  [place] [address]  Phone: [phone]  Fax: [phone]  Follow Up Time: 2 weeks    Kamaljit Philip  Gastroenterology  [address], [address]  [place] [address]  Phone: [phone]  Fax: [phone]  Follow Up Time: 1 month    PMD,   Phone: (   )    -  Fax: (   )    -  Follow Up Time: 2 weeks

## 2025-03-25 NOTE — DIETITIAN INITIAL EVALUATION ADULT - [id number]_GEN_A_CORE
Detail Level: Simple Additional Notes: Patient consent was obtained to proceed with the visit and recommended plan of care after discussion of all risks and benefits, including the risks of COVID-19 exposure. No active nutrition diagnosis identified at this time

## 2025-03-25 NOTE — DISCHARGE NOTE PROVIDER - ATTENDING DISCHARGE PHYSICAL EXAMINATION:
Vital Signs  T(C): 36.6 (25 Mar 2025 08:25), Max: 36.8 (24 Mar 2025 14:14)  T(F): 97.8 (25 Mar 2025 08:25), Max: 98.3 (24 Mar 2025 14:14)  HR: 66 (25 Mar 2025 08:25) (63 - 81)  BP: 101/60 (25 Mar 2025 08:25) (100/64 - 113/65)  BP(mean): 79 (24 Mar 2025 23:54) (73 - 81)  RR: 18 (25 Mar 2025 08:25) (16 - 18)  SpO2: 97% (25 Mar 2025 08:25) (96% - 100%)  Parameters below as of 25 Mar 2025 08:25  Patient On (Oxygen Delivery Method): room air  General: Middle aged male sitting in bed comfortably. No acute distress  HEENT: EOMI. Clear conjunctivae. Moist mucus membrane  Neck: Supple.   Chest: Good air entry. No wheezing, rales or rhonchi.   Heart: S1 & S2 with RRR.  Abdomen: Non distended. Soft. Non-tender. + BS  Ext: No pedal edema. No calf tenderness   Neuro: Active and alert. No focal deficit. No speech disorder.  Skin: Warm and Dry  Psychiatry: Normal mood and affect

## 2025-03-25 NOTE — DIETITIAN INITIAL EVALUATION ADULT - OTHER INFO
59 yo male with no known pmhx presenting to the ED with RLQ and R flank pain. CT showing mild hydronephrosis with 5 mm renal stone. Additional findings include thickening of the antrum and duodenal bulb with focal ulceration/outpouching along the lesser curvature with enlarged lymph nodes.  #Right Ureteral Stone with Mild Hydronephrosis   #Gastric Thickening with Surrounding Lymph Nodes

## 2025-03-25 NOTE — CHART NOTE - NSCHARTNOTEFT_GEN_A_CORE
EGD Report  Date: 3/24/2025 9:55 AM  Patient Name: SNOW LARSON  MRN: [id number]  Account Number:  [id number]  Gender: Male   (age): 1965 (60)  Instrument(s):  GIF [id number]  Attending/Fellow:  KATHE RETANA MD  ASA Class:  P2 - 3/24/2025 10:37 AM KATHE RETANA MD  History of Present Illness:  Abdominal pain, dark stools, anemia  Administered Medications:  As per Anesthesiology Record  Indications:  Dark stools - R19.5  Anemia: 285.9 - D64.9  Abnormal CT scan, stomach - R93.3  Procedure:  The procedure, indications, preparation and potential complications were  explained to the patient, who indicated understanding and signed the  corresponding consent forms. MAC was administered by anesthesiologist.  Continuous pulse oximetry and blood pressure monitoring were used throughout the  procedure. Supplemental oxygen was used. Patient was placed in the left lateral  decubitus position. The endoscope was introduced through the mouth and advanced  under direct visualization until the second part of the duodenum was reached.  Patient tolerance to the procedure was good. The procedure was not difficult.  Blood loss was minimal.  Limitations/Complications:  There were no apparent limitations or complications  Findings:  Esophagus Lumen A Schatzki's ring was found in the esophagus. Non-obstructing.  A small size hiatal hernia was seen.  Stomach Excavated lesions A single non-bleeding 12 mm ulcer was found in the  antrum. Biopsies obtained from ulcer edge.Multiple cold forceps biopsies were  performed for histology.  Duodenum Excavated lesions A single cratered 2.5 cm ulcer was found in the  duodenal bulb. A clot suggested recent bleeding.  Impressions:  Esophageal ring.  Esophageal hiatal hernia.  Ulcer in the antrum. (Biopsy).  Ulcer in the duodenal bulb.  Plan:  Clear liquid diet for now. Give 1u pRBC due to Hgb 7.3 and soft BP. IV PPI BID.  No NSAIDs. Trend Hgb daily. Await pathology. Will need repeat EGD in 8 wks to  assess for healing.  Specimens:  Jar # 1 :  Biopsy in the random gastric  Test(s) requested: Histology  Jar # 2 :  Biopsy in the antral ulcer  Test(s) requested: Histology  Pathology:  Pathology was sent to lab, waiting for results  KATHE RETANA MD  Version 1, Electronically signed on 3/24/2025 10:48:17 AM by KATHE RETANA MD
Chart reviewed admitted earlier today by night team   he is seen post up in recovery room   c/o mild groin pain     MEDICATIONS  (STANDING):  acetaminophen     Tablet .. 975 milliGRAM(s) Oral every 6 hours  cefTRIAXone Injectable. 1000 milliGRAM(s) IV Push every 24 hours  cyanocobalamin 1000 MICROGram(s) Oral daily  influenza   Vaccine 0.5 milliLiter(s) IntraMuscular once  iron sucrose IVPB 100 milliGRAM(s) IV Intermittent every 7 days  lactated ringers. 1000 milliLiter(s) (75 mL/Hr) IV Continuous <Continuous>  ondansetron Injectable 4 milliGRAM(s) IV Push once  pantoprazole    Tablet 40 milliGRAM(s) Oral two times a day  tamsulosin 0.4 milliGRAM(s) Oral at bedtime        Vital Signs Last 24 Hrs  T(C): 36.6 (23 Mar 2025 11:15), Max: 36.8 (23 Mar 2025 05:10)  T(F): 97.8 (23 Mar 2025 11:15), Max: 98.2 (23 Mar 2025 05:10)  HR: 70 (23 Mar 2025 11:45) (59 - 80)  BP: 114/86 (23 Mar 2025 11:45) (114/86 - 135/75)  BP(mean): 91 (23 Mar 2025 11:15) (91 - 103)  RR: 16 (23 Mar 2025 11:45) (16 - 18)  SpO2: 100% (23 Mar 2025 11:45) (99% - 100%)    Parameters below as of 23 Mar 2025 11:45  Patient On (Oxygen Delivery Method): room air    General awake alert   Neck supple   Pulmonary CTA   ext no edema                             8.4    5.54  )-----------( 325      ( 23 Mar 2025 06:01 )             29.3     03-23    137  |  102  |  12.4  ----------------------------<  96  4.0   |  27.0  |  0.67    Ca    8.4      23 Mar 2025 06:01    TPro  6.5[L]  /  Alb  3.5  /  TBili  0.2[L]  /  DBili  x   /  AST  25  /  ALT  <5  /  AlkPhos  65  03-23      59 yo male with no known pmhx presenting to the ED with RLQ and R flank pain. CT showing mild hydronephrosis with 5 mm renal stone. Additional findings include thickening of the antrum and duodenal bulb with focal ulceration/outpouching along the lesser curvature with enlarged lymph nodes.    1-Right Ureteral Stone with Mild Hydronephrosis    consulted   s/p ureteral stent Insertion by Dr Peraza this am   pain meds as needed   -Continue Flomax  -cont empirical iv rocephin   -Continue NS @ 100 cc/hr      2-Gastric Thickening with Surrounding Lymph Nodes on CT   GI consult requested may need EGD   cont PPI   -CT showing thickening of the antrum and duodenal bulb with focal ulceration/outpouching along the lesser curvature with enlarged lymph nodes; consider gastritis/duodenitis and peptic ulcer disease or gastric malignancy  -Patient has not had colo/EGD in the past  monitor HB     3- Anemia , iron deficiency likely cronic   microcytic   low iron levels  as reviewed   start IV venofer x3 doses   low normal vit b12 added po supplement   cont to monitor Hb       Dvt prophylaxis   anemia, s/p stent hold chemical prophylaxis   cont compression devices     home in 2-4 days likely
Endoscopy Report from yesterday did not populate into [place] due to technical issue. See report below.     EGD Report  Date:	3/24/2025 9:55 AM  Patient Name:	SNOW LARSON  MRN:	[id number]  Account Number:	  [id number]  Gender:	Male   (age):	1965 (60)  Instrument(s):	  GIF [id number]  Attending/Fellow:	   KATHE RETANA MD     ASA Class:	   P2 - 3/24/2025 10:37 AM KATHE RETANA MD     History of Present Illness:  Abdominal pain, dark stools, anemia    Administered Medications:	  As per Anesthesiology Record    Indications:	  Dark stools - R19.5  Anemia: 285.9 - D64.9  Abnormal CT scan, stomach - R93.3    Procedure:  The procedure, indications, preparation and potential complications were explained to the patient, who indicated understanding and signed the corresponding consent forms. MAC was administered by anesthesiologist. Continuous pulse oximetry and blood pressure monitoring were used throughout the procedure. Supplemental oxygen was used. Patient was placed in the left lateral decubitus position. The endoscope was introduced through the mouth and advanced under direct visualization until the second part of the duodenum was reached. Patient tolerance to the procedure was good. The procedure was not difficult. Blood loss was minimal.    Limitations/Complications:	  There were no apparent limitations or complications    Findings:  Esophagus  A Schatzki's ring was found in the esophagus. Non-obstructing. A small size hiatal hernia was seen.    Stomach	Excavated lesions	A single non-bleeding 12 mm ulcer was found in the antrum. Biopsies obtained from ulcer edge. Multiple cold forceps biopsies were performed for histology.    Duodenum	Excavated lesions	A single cratered 2.5 cm ulcer was found in the duodenal bulb. A clot suggested recent bleeding.        Impressions:  	Esophageal ring.  	Esophageal hiatal hernia.  	Ulcer in the antrum. (Biopsy).  	Ulcer in the duodenal bulb.  Plan:	  Clear liquid diet for now. Give 1u pRBC due to Hgb 7.3 and soft BP. IV PPI BID. No NSAIDs. Trend Hgb daily. Await pathology. Will need repeat EGD in 8 wks to assess for healing.     Specimens:  Jar # 1 :  Biopsy in the random gastric  Test(s) requested: Histology    Jar # 2 :  Biopsy in the antral ulcer  Test(s) requested: Histology  Pathology:	   Pathology was sent to lab, waiting for results       KATHE RETANA MD	   Version 1, Electronically signed on 3/24/2025 10:48:17 AM by MD SNOW CONROY, MRN [id number],  1965
Patient is a 60y old  Male who presents with a chief complaint of Kidney Stone with Hydronephrosis; abnormal CT finding in stomach          ALLERGIES:  No Known Allergies    MEDICATIONS  (STANDING):  cefTRIAXone Injectable. 1000 milliGRAM(s) IV Push every 24 hours  cyanocobalamin 1000 MICROGram(s) Oral daily  influenza   Vaccine 0.5 milliLiter(s) IntraMuscular once  iron sucrose IVPB 100 milliGRAM(s) IV Intermittent every 24 hours  pantoprazole  Injectable 40 milliGRAM(s) IV Push two times a day  sodium chloride 0.9%. 1000 milliLiter(s) (100 mL/Hr) IV Continuous <Continuous>  tamsulosin 0.4 milliGRAM(s) Oral at bedtime    MEDICATIONS  (PRN):  acetaminophen     Tablet .. 650 milliGRAM(s) Oral every 6 hours PRN Temp greater or equal to 38C (100.4F), Mild Pain (1 - 3)  aluminum hydroxide/magnesium hydroxide/simethicone Suspension 30 milliLiter(s) Oral every 4 hours PRN Dyspepsia  melatonin 3 milliGRAM(s) Oral at bedtime PRN Insomnia  ondansetron Injectable 4 milliGRAM(s) IV Push every 8 hours PRN Nausea and/or Vomiting    Vital Signs Last 24 Hrs  T(F): 97.7 (23 Mar 2025 06:28), Max: 98.2 (23 Mar 2025 05:10)  HR: 64 (23 Mar 2025 06:28) (59 - 80)  BP: 135/75 (23 Mar 2025 06:28) (125/78 - 135/75)  RR: 18 (23 Mar 2025 06:28) (18 - 18)  SpO2: 99% (23 Mar 2025 06:28) (99% - 100%)  I&O's Summary      PHYSICAL EXAM:  General:   ENT:   Neck:   Lungs:   Cardio: RRR, S1/S2, No murmur  Abdomen: Soft, Nontender, Nondistended; Bowel sounds present  Extremities: No calf tenderness, No pitting edema    LABS:                        8.4    5.54  )-----------( 325      ( 23 Mar 2025 06:01 )             29.3     03-23    137  |  102  |  12.4  ----------------------------<  96  4.0   |  27.0  |  0.67    Ca    8.4      23 Mar 2025 06:01    TPro  6.5  /  Alb  3.5  /  TBili  0.2  /  DBili  x   /  AST  25  /  ALT  <5  /  AlkPhos  65  03-23      Lipase: 445 U/L (03-23-25 @ 06:01)  Lipase: 166 U/L (03-23-25 @ 00:13)                                  Urinalysis Basic - ( 23 Mar 2025 06:01 )    Color: x / Appearance: x / SG: x / pH: x  Gluc: 96 mg/dL / Ketone: x  / Bili: x / Urobili: x   Blood: x / Protein: x / Nitrite: x   Leuk Esterase: x / RBC: x / WBC x   Sq Epi: x / Non Sq Epi: x / Bacteria: x          RADIOLOGY & ADDITIONAL TESTS:

## 2025-03-25 NOTE — DISCHARGE NOTE PROVIDER - HOSPITAL COURSE
59 yo male with no known pmhx presenting to the ED with RLQ and R flank pain. CT showing mild hydronephrosis with 5 mm renal stone. Additional findings include thickening of the antrum and duodenal bulb with focal ulceration/outpouching along the lesser curvature with enlarged lymph nodes.  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   He was started on Flomax and was given empiric Rocephin.     For thickening of antrum and duodenal bulb, he was seen by GI and had Endoscopy on 3/24 - found to have 2 ulcers. GI is recommending Protonix + Carafate and repeat EGD in few months.   During hospitalization, he was transfused 1 PRBC for Iron Deficiency Anemia likely Chronic Blood Loss from GI. He was also given Venofer x 2.     He is stable for discharge.

## 2025-03-25 NOTE — DISCHARGE NOTE PROVIDER - NSDCMRMEDTOKEN_GEN_ALL_CORE_FT
ferrous sulfate 325 mg (65 mg elemental iron) oral delayed release tablet: 1 tab(s) orally 2 times a day  pantoprazole 40 mg oral delayed release tablet: 1 tab(s) orally 2 times a day  sucralfate 1 g oral tablet: 1 tab(s) orally 4 times a day  tamsulosin 0.4 mg oral capsule: 1 cap(s) orally once a day (at bedtime)

## 2025-03-25 NOTE — PROGRESS NOTE ADULT - NS ATTEND AMEND GEN_ALL_CORE FT
I agree with assessment and plan as above. Pt presented with abdominal pain and anemia. He was found to have giant duodenal ulcer with adherent clot in duodenal bulb and antral ulcer. Hgb stable post procedure. Advance diet as tolerated. Continue PPI BID 30 min before meals. Avoid NSAIDs. Patient to follow up in GI office after discharge. He will need repeat EGD to assess for healing of large ulcers. He was informed of this plan yesterday and today with . Await pathology.

## 2025-03-25 NOTE — PROGRESS NOTE ADULT - ASSESSMENT
60y Male with no significant PMHx who presents to the ED with RLQ and right flank pain with associated N/V and decreased appetite. GI consulted for anemia and abnormal radiology finding of gastric wall thickening.    Anemia  Abnormal radiology finding of gastric wall thickening      s/p EGD yesterday shows  gastric ulcer, a large duodenal ulcer measuring 2.5 cm at the duodenal bulb, esophageal ring, esophageal hiatal hernia, ulcer in the antrum.  Hemoglobin remains stable, no evidence od overt GI bleeding  -continue Protonix 40 mg BID, 30 min before breakfast and 30 min before dinner  -Cont Sucralfate 1 gm QID  -Avoid use of NSAIDs   -Diet as tolerating. Can d/c home from GI standpoint  -Needs follow up with GI as outpatient. Needs repeat EGD in 6-8 weeks to confirm resolution of ulcer  further care per primary team. D/w the patient and primary team   . ID # [id number] used

## 2025-03-25 NOTE — DISCHARGE NOTE NURSING/CASE MANAGEMENT/SOCIAL WORK - NSDCPEFALRISK_GEN_ALL_CORE
For information on Fall & Injury Prevention, visit: [email or web address] OR  [email or web address] OR  https://www.cdc.gov/steadi/patient.html

## 2025-03-25 NOTE — PROGRESS NOTE ADULT - REASON FOR ADMISSION
Kidney Stone with Hydronephrosis; abnormal CT finding in stomach

## 2025-03-25 NOTE — DIETITIAN INITIAL EVALUATION ADULT - PERTINENT MEDS FT
MEDICATIONS  (STANDING):  cyanocobalamin 1000 MICROGram(s) Oral daily  iron sucrose IVPB 200 milliGRAM(s) IV Intermittent every 24 hours

## 2025-03-25 NOTE — DISCHARGE NOTE PROVIDER - NSDCCPCAREPLAN_GEN_ALL_CORE_FT
PRINCIPAL DISCHARGE DIAGNOSIS  Diagnosis: Ureteral calculus, right  Assessment and Plan of Treatment: s/p stent placement.   Continue Flomax.  Follow up with Urology in 2 weeks.      SECONDARY DISCHARGE DIAGNOSES  Diagnosis: PUD (peptic ulcer disease)  Assessment and Plan of Treatment: Continue Protonix and Carafate.  Follow up with GI in 1 month.

## 2025-03-25 NOTE — DISCHARGE NOTE NURSING/CASE MANAGEMENT/SOCIAL WORK - PATIENT PORTAL LINK FT
You can access the FollowMyHealth Patient Portal offered by [facility] by registering at the following website: [email or web address]. By joining [facility]’s FollowMyHealth portal, you will also be able to view your health information using other applications (apps) compatible with our system.

## 2025-03-25 NOTE — DISCHARGE NOTE PROVIDER - CARE PROVIDERS DIRECT ADDRESSES
,DirectAddress_Unknown,[email or web address],DirectAddress_Unknown

## 2025-03-25 NOTE — DISCHARGE NOTE NURSING/CASE MANAGEMENT/SOCIAL WORK - FINANCIAL ASSISTANCE
[facility] provides services at a reduced cost to those who are determined to be eligible through [facility]’s financial assistance program. Information regarding [facility]’s financial assistance program can be found by going to [email or web address] or by calling [phone].

## 2025-03-26 LAB — SURGICAL PATHOLOGY STUDY: SIGNIFICANT CHANGE UP

## 2025-04-04 ENCOUNTER — LABORATORY RESULT (OUTPATIENT)
Age: 60
End: 2025-04-04

## 2025-04-04 ENCOUNTER — APPOINTMENT (OUTPATIENT)
Dept: UROLOGY | Facility: CLINIC | Age: 60
End: 2025-04-04

## 2025-04-04 ENCOUNTER — OUTPATIENT (OUTPATIENT)
Dept: OUTPATIENT SERVICES | Facility: HOSPITAL | Age: 60
LOS: 1 days | End: 2025-04-04
Payer: SELF-PAY

## 2025-04-04 DIAGNOSIS — R35.0 FREQUENCY OF MICTURITION: ICD-10-CM

## 2025-04-04 PROBLEM — N20.1 URETERAL STONE: Status: ACTIVE | Noted: 2025-04-04

## 2025-04-04 PROCEDURE — G0463: CPT

## 2025-04-05 LAB
APPEARANCE: CLEAR
BACTERIA UR CULT: NORMAL
BILIRUBIN URINE: NEGATIVE
BLOOD URINE: ABNORMAL
COLOR: YELLOW
GLUCOSE QUALITATIVE U: NEGATIVE MG/DL
KETONES URINE: NEGATIVE MG/DL
LEUKOCYTE ESTERASE URINE: ABNORMAL
NITRITE URINE: NEGATIVE
PH URINE: 6
PROTEIN URINE: 30 MG/DL
SPECIFIC GRAVITY URINE: 1.01
UROBILINOGEN URINE: 0.2 MG/DL

## 2025-04-07 ENCOUNTER — NON-APPOINTMENT (OUTPATIENT)
Age: 60
End: 2025-04-07

## 2025-04-07 DIAGNOSIS — N20.1 CALCULUS OF URETER: ICD-10-CM

## 2025-04-08 DIAGNOSIS — A04.8 OTHER SPECIFIED BACTERIAL INTESTINAL INFECTIONS: ICD-10-CM

## 2025-04-08 RX ORDER — METRONIDAZOLE 500 MG/1
500 TABLET ORAL TWICE DAILY
Qty: 28 | Refills: 0 | Status: ACTIVE | COMMUNITY
Start: 2025-04-08 | End: 1900-01-01

## 2025-04-08 RX ORDER — OMEPRAZOLE 20 MG/1
20 TABLET, DELAYED RELEASE ORAL
Qty: 28 | Refills: 0 | Status: ACTIVE | COMMUNITY
Start: 2025-04-08 | End: 1900-01-01

## 2025-04-08 RX ORDER — AMOXICILLIN 500 MG/1
500 TABLET, FILM COATED ORAL TWICE DAILY
Qty: 56 | Refills: 0 | Status: ACTIVE | COMMUNITY
Start: 2025-04-08 | End: 1900-01-01

## 2025-04-09 ENCOUNTER — APPOINTMENT (OUTPATIENT)
Dept: UROLOGY | Facility: CLINIC | Age: 60
End: 2025-04-09

## 2025-04-11 ENCOUNTER — OUTPATIENT (OUTPATIENT)
Dept: OUTPATIENT SERVICES | Facility: HOSPITAL | Age: 60
LOS: 1 days | End: 2025-04-11

## 2025-04-11 ENCOUNTER — APPOINTMENT (OUTPATIENT)
Dept: UROLOGY | Facility: CLINIC | Age: 60
End: 2025-04-11
Payer: COMMERCIAL

## 2025-04-11 DIAGNOSIS — R35.0 FREQUENCY OF MICTURITION: ICD-10-CM

## 2025-04-11 DIAGNOSIS — N20.1 CALCULUS OF URETER: ICD-10-CM

## 2025-04-11 PROCEDURE — 99212 OFFICE O/P EST SF 10 MIN: CPT

## 2025-04-18 ENCOUNTER — OUTPATIENT (OUTPATIENT)
Dept: OUTPATIENT SERVICES | Facility: HOSPITAL | Age: 60
LOS: 1 days | End: 2025-04-18
Payer: SELF-PAY

## 2025-04-18 VITALS
WEIGHT: 163.36 LBS | SYSTOLIC BLOOD PRESSURE: 110 MMHG | OXYGEN SATURATION: 99 % | HEART RATE: 61 BPM | HEIGHT: 69.49 IN | TEMPERATURE: 98 F | RESPIRATION RATE: 17 BRPM | DIASTOLIC BLOOD PRESSURE: 75 MMHG

## 2025-04-18 DIAGNOSIS — Z98.890 OTHER SPECIFIED POSTPROCEDURAL STATES: Chronic | ICD-10-CM

## 2025-04-18 DIAGNOSIS — N20.1 CALCULUS OF URETER: ICD-10-CM

## 2025-04-18 LAB
ANION GAP SERPL CALC-SCNC: 15 MMOL/L — SIGNIFICANT CHANGE UP (ref 5–17)
BUN SERPL-MCNC: 18 MG/DL — SIGNIFICANT CHANGE UP (ref 7–23)
CALCIUM SERPL-MCNC: 9.1 MG/DL — SIGNIFICANT CHANGE UP (ref 8.4–10.5)
CHLORIDE SERPL-SCNC: 103 MMOL/L — SIGNIFICANT CHANGE UP (ref 96–108)
CO2 SERPL-SCNC: 22 MMOL/L — SIGNIFICANT CHANGE UP (ref 22–31)
CREAT SERPL-MCNC: 0.9 MG/DL — SIGNIFICANT CHANGE UP (ref 0.5–1.3)
EGFR: 98 ML/MIN/1.73M2 — SIGNIFICANT CHANGE UP
EGFR: 98 ML/MIN/1.73M2 — SIGNIFICANT CHANGE UP
GLUCOSE SERPL-MCNC: 82 MG/DL — SIGNIFICANT CHANGE UP (ref 70–99)
HCT VFR BLD CALC: 37.7 % — LOW (ref 39–50)
HGB BLD-MCNC: 11.3 G/DL — LOW (ref 13–17)
MCHC RBC-ENTMCNC: 22.5 PG — LOW (ref 27–34)
MCHC RBC-ENTMCNC: 30 G/DL — LOW (ref 32–36)
MCV RBC AUTO: 75.1 FL — LOW (ref 80–100)
NRBC BLD AUTO-RTO: 0 /100 WBCS — SIGNIFICANT CHANGE UP (ref 0–0)
PLATELET # BLD AUTO: 307 K/UL — SIGNIFICANT CHANGE UP (ref 150–400)
POTASSIUM SERPL-MCNC: 4.1 MMOL/L — SIGNIFICANT CHANGE UP (ref 3.5–5.3)
POTASSIUM SERPL-SCNC: 4.1 MMOL/L — SIGNIFICANT CHANGE UP (ref 3.5–5.3)
RBC # BLD: 5.02 M/UL — SIGNIFICANT CHANGE UP (ref 4.2–5.8)
RBC # FLD: 29.2 % — HIGH (ref 10.3–14.5)
SODIUM SERPL-SCNC: 140 MMOL/L — SIGNIFICANT CHANGE UP (ref 135–145)
WBC # BLD: 3.94 K/UL — SIGNIFICANT CHANGE UP (ref 3.8–10.5)
WBC # FLD AUTO: 3.94 K/UL — SIGNIFICANT CHANGE UP (ref 3.8–10.5)

## 2025-04-18 RX ORDER — CEFAZOLIN SODIUM IN 0.9 % NACL 3 G/100 ML
2000 INTRAVENOUS SOLUTION, PIGGYBACK (ML) INTRAVENOUS ONCE
Refills: 0 | Status: COMPLETED | OUTPATIENT
Start: 2025-04-23 | End: 2025-04-23

## 2025-04-18 RX ORDER — LIDOCAINE HCL/PF 10 MG/ML
0.2 VIAL (ML) INJECTION ONCE
Refills: 0 | Status: DISCONTINUED | OUTPATIENT
Start: 2025-04-23 | End: 2025-04-23

## 2025-04-18 NOTE — H&P PST ADULT - RESPIRATORY RATE (BREATHS/MIN)
17 BELONGINGS with patient:  Grey t-shirt, grey sweatshirt, blk leggings, 1 pr of grey socks, Lew Bra      In security:none        In locker: 1 pr of brown UGG boots,         In bin:        Money:none        Belongings checked in by:                    MB/DAHLIA                                                             Date: 4/8/19      I have verified these items were with me when I arrived:                                                                                    Date:        I have received all my items on my discharge day:                                                                               Date:     [facility] is not responsible for any personal items/belongings that I choose to keep in my possession during my hospitalization. I understand that any belongings which are not logged on this form are considered to be in my possession and are my responsibility.

## 2025-04-18 NOTE — H&P PST ADULT - ASSESSMENT
DASI Score: 7.5  DASI Activity: Works as a   able to go up one flight of stairs or walk 1-2 blocks without difficulty  Loose or removable teeth: denies

## 2025-04-18 NOTE — H&P PST ADULT - PRO INTERPRETER NEED 2
[place] Bed/Stretcher in lowest position, wheels locked, appropriate side rails in place/Call bell, personal items and telephone in reach/Instruct patient to call for assistance before getting out of bed/chair/stretcher/Non-slip footwear applied when patient is off stretcher/[place] to call system/Physically safe environment - no spills, clutter or unnecessary equipment/Purposeful proactive rounding/Room/bathroom lighting operational, light cord in reach

## 2025-04-18 NOTE — H&P PST ADULT - PROBLEM SELECTOR PLAN 1
Preop instructions given, pt verbalized understanding  Labs CBC BMP performed in PST  4/4- UCx negative Vern CALL  Pt is currently on Amoxicillin and Flagyl therapy + H.pylori

## 2025-04-18 NOTE — H&P PST ADULT - HISTORY OF PRESENT ILLNESS
This is a 60 year old male      Presenting to [facility] for scheduled Cystoscopy, Right ureteoscopy, laser lithtripsy, stome removal ureteral stent exchange on 4/23/25 with Dr. Burgos This is a 60 year old male with past medical history of peptic ulcer disease. Was admitted to [facility]  3/22/25 with c/o Right sided flank area pain. CT revealed obstructing 5mm calculus in distal right ureter underwent Cystoscopy, Rt retrograde pyelogram Rt insertion of ureteral stent on 3/23 during hospital admission  hbg dropping, GI c/s for gastric antrum thickening on CT, EGD done received  1 unit PRBC and iron infusions. Pt currently denies any dysuria, flanks area pain, hematuria or melena. Currently on Amoxicillin and Flagyl therapy for + H. Pylori  Presenting to PST accompanied with sister for scheduled Cystoscopy, Right ureteroscopy laser lithotripsy stone removal ureteral stent exchange on 4/23/25 with Dr. Burgos

## 2025-04-18 NOTE — H&P PST ADULT - NSICDXPASTSURGICALHX_GEN_ALL_CORE_FT
PAST SURGICAL HISTORY:  No significant past surgical history      PAST SURGICAL HISTORY:  H/O ureteroscopy     History of esophagogastroduodenoscopy (EGD)

## 2025-04-23 ENCOUNTER — APPOINTMENT (OUTPATIENT)
Dept: UROLOGY | Facility: HOSPITAL | Age: 60
End: 2025-04-23

## 2025-04-23 ENCOUNTER — TRANSCRIPTION ENCOUNTER (OUTPATIENT)
Age: 60
End: 2025-04-23

## 2025-04-23 ENCOUNTER — RESULT REVIEW (OUTPATIENT)
Age: 60
End: 2025-04-23

## 2025-04-23 ENCOUNTER — OUTPATIENT (OUTPATIENT)
Dept: OUTPATIENT SERVICES | Facility: HOSPITAL | Age: 60
LOS: 1 days | End: 2025-04-23
Payer: COMMERCIAL

## 2025-04-23 VITALS
TEMPERATURE: 98 F | RESPIRATION RATE: 18 BRPM | DIASTOLIC BLOOD PRESSURE: 73 MMHG | SYSTOLIC BLOOD PRESSURE: 105 MMHG | OXYGEN SATURATION: 97 % | WEIGHT: 163.36 LBS | HEIGHT: 69.49 IN | HEART RATE: 71 BPM

## 2025-04-23 VITALS
HEART RATE: 63 BPM | RESPIRATION RATE: 14 BRPM | DIASTOLIC BLOOD PRESSURE: 77 MMHG | SYSTOLIC BLOOD PRESSURE: 119 MMHG | OXYGEN SATURATION: 100 % | TEMPERATURE: 97 F

## 2025-04-23 DIAGNOSIS — Z98.890 OTHER SPECIFIED POSTPROCEDURAL STATES: Chronic | ICD-10-CM

## 2025-04-23 DIAGNOSIS — N20.1 CALCULUS OF URETER: ICD-10-CM

## 2025-04-23 PROCEDURE — 52352 CYSTOURETERO W/STONE REMOVE: CPT | Mod: RT

## 2025-04-23 PROCEDURE — 76000 FLUOROSCOPY <1 HR PHYS/QHP: CPT

## 2025-04-23 PROCEDURE — 88300 SURGICAL PATH GROSS: CPT

## 2025-04-23 PROCEDURE — C1769: CPT

## 2025-04-23 PROCEDURE — G0463: CPT

## 2025-04-23 PROCEDURE — 82365 CALCULUS SPECTROSCOPY: CPT

## 2025-04-23 PROCEDURE — C1758: CPT

## 2025-04-23 PROCEDURE — 88300 SURGICAL PATH GROSS: CPT | Mod: 26

## 2025-04-23 PROCEDURE — 85027 COMPLETE CBC AUTOMATED: CPT

## 2025-04-23 PROCEDURE — 80048 BASIC METABOLIC PNL TOTAL CA: CPT

## 2025-04-23 PROCEDURE — C1889: CPT

## 2025-04-23 DEVICE — GUIDEWIRE SENSOR DUAL-FLEX NITINOL STRAIGHT .035" X 150CM: Type: IMPLANTABLE DEVICE | Status: FUNCTIONAL

## 2025-04-23 DEVICE — URETERAL CATH OPEN END 5FR 70CM: Type: IMPLANTABLE DEVICE | Status: FUNCTIONAL

## 2025-04-23 DEVICE — STONE BASKET ZEROTIP NITINOL 4-WIRE 1.9FR 120CM X 12MM: Type: IMPLANTABLE DEVICE | Status: FUNCTIONAL

## 2025-04-23 RX ORDER — ONDANSETRON HCL/PF 4 MG/2 ML
4 VIAL (ML) INJECTION ONCE
Refills: 0 | Status: DISCONTINUED | OUTPATIENT
Start: 2025-04-23 | End: 2025-04-23

## 2025-04-23 RX ORDER — HYDROMORPHONE/SOD CHLOR,ISO/PF 2 MG/10 ML
0.5 SYRINGE (ML) INJECTION
Refills: 0 | Status: DISCONTINUED | OUTPATIENT
Start: 2025-04-23 | End: 2025-04-23

## 2025-04-23 RX ORDER — SODIUM CHLORIDE 9 G/1000ML
1000 INJECTION, SOLUTION INTRAVENOUS
Refills: 0 | Status: DISCONTINUED | OUTPATIENT
Start: 2025-04-23 | End: 2025-04-23

## 2025-04-23 RX ADMIN — SODIUM CHLORIDE 30 MILLILITER(S): 9 INJECTION, SOLUTION INTRAVENOUS at 10:40

## 2025-04-23 NOTE — ASU DISCHARGE PLAN (ADULT/PEDIATRIC) - CARE PROVIDER_API CALL
Pranav Burgos  Urology  [address] [place] [address] [address]  [place] [address]  Phone: [phone]  Fax: [phone]  Follow Up Time: 1 month

## 2025-04-23 NOTE — PRE-ANESTHESIA EVALUATION ADULT - HEIGHT IN CM
[de-identified] : 77 y.o M presents to the office complaining of 1+ years of bilateral sided shoulder pain. Complains of pain and difficulty with overhead activity. Denies traumatic OSITO. Denies previous history of injury to either shoulder. Denies mechanical symptoms. Denies NT. Here today to discuss treatment options for involved injury. \par \par The patient's past medical history, past surgical history, medications, allergies, and social history were reviewed by me today with the patient and documented accordingly. In addition, the patient's family history, which is noncontributory to this visit, was also reviewed.\par  176.5

## 2025-04-23 NOTE — ASU DISCHARGE PLAN (ADULT/PEDIATRIC) - ASU DC SPECIAL INSTRUCTIONSFT
It is common to have blood in the urine after your procedure.  It may be pink or even red; inform your doctor if you have a significant amount of clots in the urine or if you are unable to void at all.  -It is not uncommon to have some burning when you urinate, this will improve over the next few days.  -You have an internal stent (a hollow tube that runs from the kidney to your bladder) after your procedure, helping your kidney drain down to your bladder after your surgery.  Some patients do not notice that they have a stent, while others complain of the sensation of needing to urinate frequently, burning on urination, or even some back pain (especially when they go to urinate). These sensations usually improve gradually, some faster than others. This is not uncommon, but may initially warrant the use of the pain medication which you were prescribed.  While the stent is in place, your urine may continue to be bloody. This stent is temporary and must be removed/exchanged by your urologist.  -Provided that you are not restricted with fluids by your physician, you should drink 6-8 (8 oz.) glasses of fluid per day.  -You may resume your regular diet and regular medication regimen.    -You may shower or bathe.    -You may take over the counter pain medications such as Motrin and Tylenol as needed for pain.  Do not exceed 4 grams of Tylenol daily.  Each medication may be taken every 6 hours.  You may alternate these medications such that you take either one every 3 hours.  If you have severe pain that does not improve with the pain medication or you have persistent vomiting, call your doctor.  -Please continue taking flomax for at least another week.   -As you have just underwent general anesthesia, you should refrain from driving, heavy lifting, smoking, alcohol consumption, or important decision making for the next 24 hours.  You may climb stairs and you may resume sexual activity.  -Call your physician if you have a fever over 101F.  -Make a follow up appointment for with your urologist when you arrive home (or the next business day).  -Call your urologist during normal business hours with any other routine questions.

## 2025-04-23 NOTE — PATIENT PROFILE ADULT - FALL HARM RISK - UNIVERSAL INTERVENTIONS
General Sunscreen Counseling: I recommended a broad spectrum sunscreen with a SPF of 30 or higher and the importance of sun protective clothing. Bed in lowest position, wheels locked, appropriate side rails in place/Call bell, personal items and telephone in reach/Instruct patient to call for assistance before getting out of bed or chair/Non-slip footwear when patient is out of bed/[place] to call system/Physically safe environment - no spills, clutter or unnecessary equipment/Purposeful Proactive Rounding/Room/bathroom lighting operational, light cord in reach Detail Level: Detailed

## 2025-04-23 NOTE — ASU DISCHARGE PLAN (ADULT/PEDIATRIC) - FINANCIAL ASSISTANCE
[facility] provides services at a reduced cost to those who are determined to be eligible through [facility]’s financial assistance program. Information regarding [facility]’s financial assistance program can be found by going to [email or web address] or by calling [phone].

## 2025-04-23 NOTE — PRE-OP CHECKLIST - IDENTIFICATION BAND VERIFIED
[facility] Call Center    Phone Message    May a detailed message be left on voicemail: yes     Reason for Call: Medication Refill Request    Has the patient contacted the pharmacy for the refill? Yes   Name of medication being requested: mycophenolate (GENERIC EQUIVALENT) 250 MG capsule   Provider who prescribed the medication: Dr. Felipe  Pharmacy: [facility] [place], [place] - [address] AT [address]  Date medication is needed: ASAP     Action Taken: Message routed to:  [facility] ([facility] neph    Travel Screening: Not Applicable                                                                       done

## 2025-04-23 NOTE — PRE-OP CHECKLIST - BOWEL PREP
Stepped on a rock lost his footing and twisted his right ankle. Patient has visual swelling to the ankle, assisted by wheel chair. Pain 10/10. Patient took tylenol for the discomfort.
n/a

## 2025-04-24 RX ORDER — METRONIDAZOLE 250 MG
1 TABLET ORAL
Refills: 0 | DISCHARGE

## 2025-04-24 RX ORDER — SUCRALFATE 1 G
1 TABLET ORAL
Refills: 0 | DISCHARGE

## 2025-04-24 RX ORDER — AMOXICILLIN 500 MG/1
1 CAPSULE ORAL
Refills: 0 | DISCHARGE

## 2025-04-24 RX ORDER — TAMSULOSIN HYDROCHLORIDE 0.4 MG/1
1 CAPSULE ORAL
Refills: 0 | DISCHARGE

## 2025-04-24 RX ORDER — OMEPRAZOLE 20 MG/1
1 CAPSULE, DELAYED RELEASE ORAL
Refills: 0 | DISCHARGE

## 2025-04-25 PROBLEM — K27.9 PEPTIC ULCER, SITE UNSPECIFIED, UNSPECIFIED AS ACUTE OR CHRONIC, WITHOUT HEMORRHAGE OR PERFORATION: Chronic | Status: ACTIVE | Noted: 2017-11-22

## 2025-04-25 PROBLEM — N20.0 CALCULUS OF KIDNEY: Chronic | Status: ACTIVE | Noted: 2025-04-18

## 2025-04-28 LAB — SURGICAL PATHOLOGY STUDY: SIGNIFICANT CHANGE UP

## 2025-04-30 LAB
CELL MATERIAL STONE EST-MCNT: SIGNIFICANT CHANGE UP
LABORATORY COMMENT REPORT: SIGNIFICANT CHANGE UP
NIDUS STONE QN: SIGNIFICANT CHANGE UP

## 2025-05-09 ENCOUNTER — APPOINTMENT (OUTPATIENT)
Dept: UROLOGY | Facility: CLINIC | Age: 60
End: 2025-05-09

## 2025-05-09 ENCOUNTER — OUTPATIENT (OUTPATIENT)
Dept: OUTPATIENT SERVICES | Facility: HOSPITAL | Age: 60
LOS: 1 days | End: 2025-05-09
Payer: SELF-PAY

## 2025-05-09 DIAGNOSIS — N20.1 CALCULUS OF URETER: ICD-10-CM

## 2025-05-09 DIAGNOSIS — Z98.890 OTHER SPECIFIED POSTPROCEDURAL STATES: Chronic | ICD-10-CM

## 2025-05-09 DIAGNOSIS — R35.0 FREQUENCY OF MICTURITION: ICD-10-CM

## 2025-05-09 PROCEDURE — G0463: CPT

## 2025-05-09 NOTE — H&P PST ADULT - NEGATIVE RESPIRATORY AND THORAX SYMPTOMS
Called patient on 2025. I called patient to verify a fax number for [employer] and to inform them that the daughter cannot pick-up the forms for her. Patient was being told by someone in the background not to answer any questions and would not verify the patient's .  Call ended.    Message sent to patient via [employer].  
FMLA or Disability Forms were received and faxed to the Forms Completion Department today at [phone].  (Please include all appropriate authorization forms with your fax)    Did you have the patient complete (in full) and sign the \"Authorization for Disclosure of Health Information Forms Completion\" form?  YES ________  (If no, please give reason)    Please communicate to the patient that the Forms Completion team estimates their form 7-14 business day to complete.    If you have questions about this encounter, please contact the Forms Completion Dept. at [phone].     
Form completed and sent to Physician's Office electronically via in basket messaging for review and signature. Completed form will be sent to [employer] only if patient can clarify the delivery of these forms .   
Form received at [facility] on 5/8/2025.   Please note that it takes 7-10 business days for completion.  Auth received with form.    Form is to be sent for pick-up. Can only be picked-up by patient NOT the caregiver.       Form due 05/05/2025. Forms are marked as URGENT.     
Received signed and completed form from Physician's Office.    Message sent to Forms Completion Mailing Pool to mail form to patient's address on file.    I tried calling the patient to verify the delivery of her forms but she hung up on me. I then called again and left a message letting her know that her final forms will be mailed to her home address and if she would like these forms sent to [employer] then she will need to contact the forms completion team ( I left our number) to clarify how she wants these forms sent.   
The FMLA form was mailed to patient's address on file   
no wheezing/no dyspnea/no cough

## 2025-05-16 DIAGNOSIS — N20.1 CALCULUS OF URETER: ICD-10-CM

## 2025-05-23 ENCOUNTER — OUTPATIENT (OUTPATIENT)
Dept: OUTPATIENT SERVICES | Facility: HOSPITAL | Age: 60
LOS: 1 days | End: 2025-05-23
Payer: SELF-PAY

## 2025-05-23 ENCOUNTER — APPOINTMENT (OUTPATIENT)
Dept: UROLOGY | Facility: CLINIC | Age: 60
End: 2025-05-23

## 2025-05-23 DIAGNOSIS — N20.1 CALCULUS OF URETER: ICD-10-CM

## 2025-05-23 DIAGNOSIS — R35.0 FREQUENCY OF MICTURITION: ICD-10-CM

## 2025-05-23 DIAGNOSIS — Z98.890 OTHER SPECIFIED POSTPROCEDURAL STATES: Chronic | ICD-10-CM

## 2025-05-23 PROCEDURE — G0463: CPT

## 2025-05-24 NOTE — PATIENT PROFILE ADULT. - MEDICATIONS BROUGHT TO HOSPITAL, PROFILE
no abdominal pain since last night; pt reports elevated liver enzymes for the past two weeks. Endorses nausea, vomiting, diarrhea, fevers.

## 2025-05-28 DIAGNOSIS — N20.1 CALCULUS OF URETER: ICD-10-CM

## 2025-06-06 ENCOUNTER — APPOINTMENT (OUTPATIENT)
Dept: UROLOGY | Facility: CLINIC | Age: 60
End: 2025-06-06

## (undated) DEVICE — FOLEY HOLDER STATLOCK 2 WAY ADULT

## (undated) DEVICE — TUBING SUCTION 20FT

## (undated) DEVICE — SYR ASEPTO

## (undated) DEVICE — ACMI SELF-SEALING SEAL UP TO 7FR

## (undated) DEVICE — SOL IRR POUR H2O 1500ML

## (undated) DEVICE — ADAPTER ESCP CK FLO 9FR STERILE

## (undated) DEVICE — VENODYNE/SCD SLEEVE CALF MEDIUM

## (undated) DEVICE — TUBING THERMADX UROLOGY

## (undated) DEVICE — DRAPE LINGEMAN TUR

## (undated) DEVICE — POSITIONER FOAM EGG CRATE ULNAR 2PCS (PINK)

## (undated) DEVICE — DRAPE EQUIPMENT BANDED BAG 30 X 30" (SHOWER CAP)

## (undated) DEVICE — TUBING RANGER FLUID IRRIGATION SET DISP

## (undated) DEVICE — PREP BETADINE KIT

## (undated) DEVICE — IRR BULB PATHFINDER + 10"

## (undated) DEVICE — PRESSURE INFUSOR BAG 3000ML

## (undated) DEVICE — POSITIONER FOAM HEADREST (PINK)

## (undated) DEVICE — PACK CYSTO

## (undated) DEVICE — WARMING BLANKET UPPER ADULT

## (undated) DEVICE — SOL IRR BAG H2O 3000ML

## (undated) DEVICE — GLV 7.5 PROTEXIS (WHITE)

## (undated) DEVICE — GOWN TRIMAX LG